# Patient Record
Sex: MALE | Race: WHITE | NOT HISPANIC OR LATINO | Employment: FULL TIME | URBAN - NONMETROPOLITAN AREA
[De-identification: names, ages, dates, MRNs, and addresses within clinical notes are randomized per-mention and may not be internally consistent; named-entity substitution may affect disease eponyms.]

---

## 2017-05-24 ENCOUNTER — OFFICE VISIT (OUTPATIENT)
Dept: SURGERY | Facility: CLINIC | Age: 47
End: 2017-05-24

## 2017-05-24 VITALS
DIASTOLIC BLOOD PRESSURE: 88 MMHG | OXYGEN SATURATION: 98 % | HEIGHT: 76 IN | TEMPERATURE: 98.2 F | BODY MASS INDEX: 32.12 KG/M2 | WEIGHT: 263.8 LBS | SYSTOLIC BLOOD PRESSURE: 154 MMHG | HEART RATE: 62 BPM

## 2017-05-24 DIAGNOSIS — R10.11 RIGHT UPPER QUADRANT ABDOMINAL PAIN: Primary | ICD-10-CM

## 2017-05-24 PROCEDURE — 99204 OFFICE O/P NEW MOD 45 MIN: CPT | Performed by: SURGERY

## 2017-05-24 RX ORDER — DESONIDE 0.5 MG/G
OINTMENT TOPICAL
Refills: 5 | Status: ON HOLD | COMMUNITY
Start: 2017-04-19 | End: 2017-10-31

## 2017-05-25 ENCOUNTER — HOSPITAL ENCOUNTER (OUTPATIENT)
Dept: CT IMAGING | Facility: HOSPITAL | Age: 47
Discharge: HOME | End: 2017-05-25
Attending: SURGERY

## 2017-10-25 ENCOUNTER — TRANSCRIBE ORDERS (OUTPATIENT)
Dept: CARDIOLOGY | Facility: HOSPITAL | Age: 47
End: 2017-10-25

## 2017-10-25 DIAGNOSIS — R94.39 ABNORMAL STRESS TEST: Primary | ICD-10-CM

## 2017-10-27 ENCOUNTER — APPOINTMENT (OUTPATIENT)
Dept: PULMONOLOGY | Facility: HOSPITAL | Age: 47
End: 2017-10-27

## 2017-10-27 ENCOUNTER — PREP FOR SURGERY (OUTPATIENT)
Dept: OTHER | Facility: HOSPITAL | Age: 47
End: 2017-10-27

## 2017-10-27 ENCOUNTER — APPOINTMENT (OUTPATIENT)
Dept: CARDIOLOGY | Facility: HOSPITAL | Age: 47
End: 2017-10-27

## 2017-10-27 ENCOUNTER — HOSPITAL ENCOUNTER (OUTPATIENT)
Facility: HOSPITAL | Age: 47
Setting detail: HOSPITAL OUTPATIENT SURGERY
Discharge: HOME | End: 2017-10-27
Attending: INTERNAL MEDICINE | Admitting: INTERNAL MEDICINE

## 2017-10-27 VITALS
DIASTOLIC BLOOD PRESSURE: 77 MMHG | OXYGEN SATURATION: 93 % | RESPIRATION RATE: 16 BRPM | TEMPERATURE: 98.5 F | WEIGHT: 261.25 LBS | SYSTOLIC BLOOD PRESSURE: 125 MMHG | HEART RATE: 71 BPM | HEIGHT: 76 IN | BODY MASS INDEX: 31.81 KG/M2

## 2017-10-27 DIAGNOSIS — I25.10 CAD, MULTIPLE VESSEL: Primary | ICD-10-CM

## 2017-10-27 DIAGNOSIS — I25.10 CAD IN NATIVE ARTERY: Primary | ICD-10-CM

## 2017-10-27 DIAGNOSIS — R94.39 ABNORMAL STRESS TEST: ICD-10-CM

## 2017-10-27 LAB
ANION GAP SERPL CALCULATED.3IONS-SCNC: 6 MMOL/L (ref 3–11)
BUN BLD-MCNC: 13 MG/DL (ref 9–23)
BUN/CREAT SERPL: 18.6 (ref 7–25)
CALCIUM SPEC-SCNC: 8.8 MG/DL (ref 8.7–10.4)
CHLORIDE SERPL-SCNC: 104 MMOL/L (ref 99–109)
CO2 SERPL-SCNC: 26 MMOL/L (ref 20–31)
CREAT BLD-MCNC: 0.7 MG/DL (ref 0.6–1.3)
DEPRECATED RDW RBC AUTO: 43.7 FL (ref 37–54)
ERYTHROCYTE [DISTWIDTH] IN BLOOD BY AUTOMATED COUNT: 14 % (ref 11.3–14.5)
GFR SERPL CREATININE-BSD FRML MDRD: 121 ML/MIN/1.73
GLUCOSE BLD-MCNC: 91 MG/DL (ref 70–100)
HCT VFR BLD AUTO: 46.2 % (ref 38.9–50.9)
HGB BLD-MCNC: 15.3 G/DL (ref 13.1–17.5)
MCH RBC QN AUTO: 28.5 PG (ref 27–31)
MCHC RBC AUTO-ENTMCNC: 33.1 G/DL (ref 32–36)
MCV RBC AUTO: 86 FL (ref 80–99)
PLATELET # BLD AUTO: 191 10*3/MM3 (ref 150–450)
PMV BLD AUTO: 10.1 FL (ref 6–12)
POTASSIUM BLD-SCNC: 4.1 MMOL/L (ref 3.5–5.5)
RBC # BLD AUTO: 5.37 10*6/MM3 (ref 4.2–5.76)
SODIUM BLD-SCNC: 136 MMOL/L (ref 132–146)
WBC NRBC COR # BLD: 6.84 10*3/MM3 (ref 3.5–10.8)

## 2017-10-27 PROCEDURE — 93458 L HRT ARTERY/VENTRICLE ANGIO: CPT | Performed by: INTERNAL MEDICINE

## 2017-10-27 PROCEDURE — 36415 COLL VENOUS BLD VENIPUNCTURE: CPT

## 2017-10-27 PROCEDURE — 94010 BREATHING CAPACITY TEST: CPT

## 2017-10-27 PROCEDURE — C1894 INTRO/SHEATH, NON-LASER: HCPCS | Performed by: INTERNAL MEDICINE

## 2017-10-27 PROCEDURE — 25010000002 MIDAZOLAM PER 1 MG: Performed by: INTERNAL MEDICINE

## 2017-10-27 PROCEDURE — 25010000002 HEPARIN (PORCINE) PER 1000 UNITS: Performed by: INTERNAL MEDICINE

## 2017-10-27 PROCEDURE — 93880 EXTRACRANIAL BILAT STUDY: CPT

## 2017-10-27 PROCEDURE — 0 IOPAMIDOL PER 1 ML: Performed by: INTERNAL MEDICINE

## 2017-10-27 PROCEDURE — 85027 COMPLETE CBC AUTOMATED: CPT | Performed by: INTERNAL MEDICINE

## 2017-10-27 PROCEDURE — 25010000002 FENTANYL CITRATE (PF) 100 MCG/2ML SOLUTION: Performed by: INTERNAL MEDICINE

## 2017-10-27 PROCEDURE — 80048 BASIC METABOLIC PNL TOTAL CA: CPT | Performed by: INTERNAL MEDICINE

## 2017-10-27 PROCEDURE — 93005 ELECTROCARDIOGRAM TRACING: CPT | Performed by: INTERNAL MEDICINE

## 2017-10-27 PROCEDURE — 94010 BREATHING CAPACITY TEST: CPT | Performed by: INTERNAL MEDICINE

## 2017-10-27 PROCEDURE — C1769 GUIDE WIRE: HCPCS | Performed by: INTERNAL MEDICINE

## 2017-10-27 RX ORDER — ASPIRIN 325 MG
325 TABLET, DELAYED RELEASE (ENTERIC COATED) ORAL DAILY
Status: DISCONTINUED | OUTPATIENT
Start: 2017-10-27 | End: 2017-10-27 | Stop reason: HOSPADM

## 2017-10-27 RX ORDER — CHLORHEXIDINE GLUCONATE 500 MG/1
1 CLOTH TOPICAL EVERY 12 HOURS PRN
Status: CANCELLED | OUTPATIENT
Start: 2017-10-27

## 2017-10-27 RX ORDER — CHLORHEXIDINE GLUCONATE 0.12 MG/ML
15 RINSE ORAL ONCE
Status: CANCELLED | OUTPATIENT
Start: 2017-10-27 | End: 2017-10-27

## 2017-10-27 RX ORDER — ALPRAZOLAM 0.25 MG/1
0.25 TABLET ORAL 3 TIMES DAILY PRN
Status: DISCONTINUED | OUTPATIENT
Start: 2017-10-27 | End: 2017-10-27 | Stop reason: HOSPADM

## 2017-10-27 RX ORDER — ASPIRIN 325 MG
325 TABLET ORAL NIGHTLY
Status: CANCELLED | OUTPATIENT
Start: 2017-10-27 | End: 2017-10-28

## 2017-10-27 RX ORDER — TEMAZEPAM 7.5 MG/1
7.5 CAPSULE ORAL NIGHTLY PRN
Status: DISCONTINUED | OUTPATIENT
Start: 2017-10-27 | End: 2017-10-27 | Stop reason: HOSPADM

## 2017-10-27 RX ORDER — MIDAZOLAM HYDROCHLORIDE 1 MG/ML
INJECTION INTRAMUSCULAR; INTRAVENOUS AS NEEDED
Status: DISCONTINUED | OUTPATIENT
Start: 2017-10-27 | End: 2017-10-27 | Stop reason: HOSPADM

## 2017-10-27 RX ORDER — SODIUM CHLORIDE 9 MG/ML
250 INJECTION, SOLUTION INTRAVENOUS CONTINUOUS
Status: ACTIVE | OUTPATIENT
Start: 2017-10-27 | End: 2017-10-27

## 2017-10-27 RX ORDER — NITROGLYCERIN 0.4 MG/1
0.4 TABLET SUBLINGUAL
Status: CANCELLED | OUTPATIENT
Start: 2017-10-27

## 2017-10-27 RX ORDER — FENTANYL CITRATE 50 UG/ML
INJECTION, SOLUTION INTRAMUSCULAR; INTRAVENOUS AS NEEDED
Status: DISCONTINUED | OUTPATIENT
Start: 2017-10-27 | End: 2017-10-27 | Stop reason: HOSPADM

## 2017-10-27 RX ORDER — LIDOCAINE HYDROCHLORIDE 10 MG/ML
INJECTION, SOLUTION INFILTRATION; PERINEURAL AS NEEDED
Status: DISCONTINUED | OUTPATIENT
Start: 2017-10-27 | End: 2017-10-27 | Stop reason: HOSPADM

## 2017-10-27 RX ADMIN — ASPIRIN 325 MG: 325 TABLET, DELAYED RELEASE ORAL at 07:37

## 2017-10-27 NOTE — PLAN OF CARE
Problem: Patient Care Overview (Adult)  Goal: Plan of Care Review  Outcome: Ongoing (interventions implemented as appropriate)  ORIENTED TO UNIT AND ROOM UPON ADMISSION TO [facility]    10/27/17 0717   Coping/Psychosocial Response Interventions   Plan Of Care Reviewed With patient   Patient Care Overview   Progress no change         Problem: Cardiac Catheterization with/without PCI (Adult)  Goal: Signs and Symptoms of Listed Potential Problems Will be Absent or Manageable (Cardiac Catheterization with/without PCI)  Outcome: Ongoing (interventions implemented as appropriate)  PT. HERE FOR HEART CATH; PROCEDURE TEACHING DONE AT [facility] PER BLADE GILMORE FOR DR. GERMAN    10/27/17 0717   Cardiac Catheterization with/without PCI   Problems Assessed (Cardiac Catheterization) other (see comments)   Problems Present (Cardiac Catheterization) other (see comments)

## 2017-10-29 ENCOUNTER — APPOINTMENT (OUTPATIENT)
Dept: PREADMISSION TESTING | Facility: HOSPITAL | Age: 47
End: 2017-10-29

## 2017-10-29 ENCOUNTER — HOSPITAL ENCOUNTER (OUTPATIENT)
Dept: GENERAL RADIOLOGY | Facility: HOSPITAL | Age: 47
Discharge: HOME | End: 2017-10-29
Admitting: PHYSICIAN ASSISTANT

## 2017-10-29 VITALS — HEIGHT: 76 IN | BODY MASS INDEX: 32 KG/M2 | WEIGHT: 262.79 LBS

## 2017-10-29 DIAGNOSIS — I25.10 CAD IN NATIVE ARTERY: ICD-10-CM

## 2017-10-29 LAB
ABO GROUP BLD: NORMAL
ALBUMIN SERPL-MCNC: 4.7 G/DL (ref 3.2–4.8)
ALBUMIN/GLOB SERPL: 1.8 G/DL (ref 1.5–2.5)
ALP SERPL-CCNC: 98 U/L (ref 25–100)
ALT SERPL W P-5'-P-CCNC: 34 U/L (ref 7–40)
AMPHET+METHAMPHET UR QL: NEGATIVE
AMPHETAMINES UR QL: NEGATIVE
ANION GAP SERPL CALCULATED.3IONS-SCNC: 5 MMOL/L (ref 3–11)
APTT PPP: 30.5 SECONDS (ref 24–31)
AST SERPL-CCNC: 20 U/L (ref 0–33)
BARBITURATES UR QL SCN: NEGATIVE
BASOPHILS # BLD AUTO: 0.02 10*3/MM3 (ref 0–0.2)
BASOPHILS NFR BLD AUTO: 0.2 % (ref 0–1)
BENZODIAZ UR QL SCN: NEGATIVE
BILIRUB SERPL-MCNC: 0.6 MG/DL (ref 0.3–1.2)
BLD GP AB SCN SERPL QL: NEGATIVE
BUN BLD-MCNC: 10 MG/DL (ref 9–23)
BUN/CREAT SERPL: 14.3 (ref 7–25)
BUPRENORPHINE SERPL-MCNC: NEGATIVE NG/ML
CALCIUM SPEC-SCNC: 9.1 MG/DL (ref 8.7–10.4)
CANNABINOIDS SERPL QL: NEGATIVE
CHLORIDE SERPL-SCNC: 104 MMOL/L (ref 99–109)
CO2 SERPL-SCNC: 30 MMOL/L (ref 20–31)
COCAINE UR QL: NEGATIVE
CREAT BLD-MCNC: 0.7 MG/DL (ref 0.6–1.3)
DEPRECATED RDW RBC AUTO: 45.1 FL (ref 37–54)
EOSINOPHIL # BLD AUTO: 0.11 10*3/MM3 (ref 0–0.3)
EOSINOPHIL NFR BLD AUTO: 1.2 % (ref 0–3)
ERYTHROCYTE [DISTWIDTH] IN BLOOD BY AUTOMATED COUNT: 14.2 % (ref 11.3–14.5)
GFR SERPL CREATININE-BSD FRML MDRD: 121 ML/MIN/1.73
GLOBULIN UR ELPH-MCNC: 2.6 GM/DL
GLUCOSE BLD-MCNC: 95 MG/DL (ref 70–100)
HBA1C MFR BLD: 5.4 % (ref 4.8–5.6)
HCT VFR BLD AUTO: 49.6 % (ref 38.9–50.9)
HGB BLD-MCNC: 16.7 G/DL (ref 13.1–17.5)
IMM GRANULOCYTES # BLD: 0.03 10*3/MM3 (ref 0–0.03)
IMM GRANULOCYTES NFR BLD: 0.3 % (ref 0–0.6)
INR PPP: 0.96
LYMPHOCYTES # BLD AUTO: 1.75 10*3/MM3 (ref 0.6–4.8)
LYMPHOCYTES NFR BLD AUTO: 19.3 % (ref 24–44)
MAGNESIUM SERPL-MCNC: 2.1 MG/DL (ref 1.3–2.7)
MCH RBC QN AUTO: 29.2 PG (ref 27–31)
MCHC RBC AUTO-ENTMCNC: 33.7 G/DL (ref 32–36)
MCV RBC AUTO: 86.7 FL (ref 80–99)
METHADONE UR QL SCN: NEGATIVE
MONOCYTES # BLD AUTO: 0.55 10*3/MM3 (ref 0–1)
MONOCYTES NFR BLD AUTO: 6.1 % (ref 0–12)
NEUTROPHILS # BLD AUTO: 6.6 10*3/MM3 (ref 1.5–8.3)
NEUTROPHILS NFR BLD AUTO: 72.9 % (ref 41–71)
OPIATES UR QL: NEGATIVE
OXYCODONE UR QL SCN: NEGATIVE
PA ADP PRP-ACNC: 171 PRU
PCP UR QL SCN: NEGATIVE
PLATELET # BLD AUTO: 199 10*3/MM3 (ref 150–450)
PMV BLD AUTO: 10.3 FL (ref 6–12)
POTASSIUM BLD-SCNC: 4.8 MMOL/L (ref 3.5–5.5)
PROPOXYPH UR QL: NEGATIVE
PROT SERPL-MCNC: 7.3 G/DL (ref 5.7–8.2)
PROTHROMBIN TIME: 10.5 SECONDS (ref 9.6–11.5)
RBC # BLD AUTO: 5.72 10*6/MM3 (ref 4.2–5.76)
RH BLD: NEGATIVE
SODIUM BLD-SCNC: 139 MMOL/L (ref 132–146)
TRICYCLICS UR QL SCN: NEGATIVE
WBC NRBC COR # BLD: 9.06 10*3/MM3 (ref 3.5–10.8)

## 2017-10-29 PROCEDURE — 86923 COMPATIBILITY TEST ELECTRIC: CPT

## 2017-10-30 ENCOUNTER — ANESTHESIA EVENT (OUTPATIENT)
Dept: PERIOP | Facility: HOSPITAL | Age: 47
End: 2017-10-30

## 2017-10-30 ENCOUNTER — APPOINTMENT (OUTPATIENT)
Dept: GENERAL RADIOLOGY | Facility: HOSPITAL | Age: 47
End: 2017-10-30

## 2017-10-30 ENCOUNTER — HOSPITAL ENCOUNTER (INPATIENT)
Facility: HOSPITAL | Age: 47
LOS: 3 days | Discharge: HOME | End: 2017-11-02
Attending: THORACIC SURGERY (CARDIOTHORACIC VASCULAR SURGERY) | Admitting: THORACIC SURGERY (CARDIOTHORACIC VASCULAR SURGERY)

## 2017-10-30 ENCOUNTER — ANESTHESIA (OUTPATIENT)
Dept: PERIOP | Facility: HOSPITAL | Age: 47
End: 2017-10-30

## 2017-10-30 ENCOUNTER — APPOINTMENT (OUTPATIENT)
Dept: PERIOP | Facility: HOSPITAL | Age: 47
End: 2017-10-30

## 2017-10-30 DIAGNOSIS — I25.10 CAD IN NATIVE ARTERY: ICD-10-CM

## 2017-10-30 DIAGNOSIS — Z74.09 IMPAIRED FUNCTIONAL MOBILITY, BALANCE, GAIT, AND ENDURANCE: Primary | ICD-10-CM

## 2017-10-30 PROBLEM — I10 ESSENTIAL HYPERTENSION: Status: RESOLVED | Noted: 2017-10-30 | Resolved: 2017-10-30

## 2017-10-30 PROBLEM — Z95.1 S/P CABG X 4: Status: ACTIVE | Noted: 2017-10-30

## 2017-10-30 PROBLEM — J95.821 RESPIRATORY FAILURE, POST-OPERATIVE: Status: ACTIVE | Noted: 2017-10-30

## 2017-10-30 PROBLEM — I10 ESSENTIAL HYPERTENSION: Status: ACTIVE | Noted: 2017-10-30

## 2017-10-30 PROBLEM — R94.39 ABNORMAL STRESS TEST: Status: RESOLVED | Noted: 2017-10-25 | Resolved: 2017-10-30

## 2017-10-30 LAB
ABO GROUP BLD: NORMAL
ACT BLD: 103 SECONDS (ref 82–152)
ACT BLD: 109 SECONDS (ref 82–152)
ACT BLD: 444 SECONDS (ref 82–152)
ACT BLD: 483 SECONDS (ref 82–152)
ACT BLD: 555 SECONDS (ref 82–152)
ACT BLD: 934 SECONDS (ref 82–152)
ALBUMIN SERPL-MCNC: 3.4 G/DL (ref 3.2–4.8)
ALBUMIN SERPL-MCNC: 4 G/DL (ref 3.2–4.8)
ANION GAP SERPL CALCULATED.3IONS-SCNC: 5 MMOL/L (ref 3–11)
ANION GAP SERPL CALCULATED.3IONS-SCNC: 8 MMOL/L (ref 3–11)
APTT PPP: 26.1 SECONDS (ref 24–31)
APTT PPP: 26.4 SECONDS (ref 24–31)
ARTERIAL PATENCY WRIST A: ABNORMAL
ARTERIAL PATENCY WRIST A: ABNORMAL
ARTERIAL PATENCY WRIST A: NORMAL
ATMOSPHERIC PRESS: ABNORMAL MMHG
ATMOSPHERIC PRESS: ABNORMAL MMHG
ATMOSPHERIC PRESS: NORMAL MMHG
BASE EXCESS BLDA CALC-SCNC: -0.2 MMOL/L (ref 0–2)
BASE EXCESS BLDA CALC-SCNC: -0.7 MMOL/L
BASE EXCESS BLDA CALC-SCNC: -1 MMOL/L (ref -5–5)
BASE EXCESS BLDA CALC-SCNC: -1.8 MMOL/L (ref 0–2)
BASE EXCESS BLDA CALC-SCNC: -2 MMOL/L (ref -5–5)
BASE EXCESS BLDA CALC-SCNC: -3 MMOL/L (ref -5–5)
BASE EXCESS BLDA CALC-SCNC: -4 MMOL/L (ref -5–5)
BASE EXCESS BLDA CALC-SCNC: -5 MMOL/L (ref -5–5)
BASE EXCESS BLDA CALC-SCNC: -6 MMOL/L (ref -5–5)
BDY SITE: ABNORMAL
BDY SITE: ABNORMAL
BDY SITE: NORMAL
BH CV XLRA MEAS LEFT CCA RATIO VEL: 90.4 CM/SEC
BH CV XLRA MEAS LEFT DIST CCA EDV: 26.5 CM/SEC
BH CV XLRA MEAS LEFT DIST CCA PSV: 90.4 CM/SEC
BH CV XLRA MEAS LEFT DIST ICA EDV: 22 CM/SEC
BH CV XLRA MEAS LEFT DIST ICA PSV: 62.9 CM/SEC
BH CV XLRA MEAS LEFT ICA RATIO VEL: 87.4 CM/SEC
BH CV XLRA MEAS LEFT ICA/CCA RATIO: 0.97
BH CV XLRA MEAS LEFT MID ICA EDV: 18.5 CM/SEC
BH CV XLRA MEAS LEFT MID ICA PSV: 60.1 CM/SEC
BH CV XLRA MEAS LEFT PROX CCA EDV: 28.5 CM/SEC
BH CV XLRA MEAS LEFT PROX CCA PSV: 101 CM/SEC
BH CV XLRA MEAS LEFT PROX ECA PSV: 111 CM/SEC
BH CV XLRA MEAS LEFT PROX ICA EDV: 22.6 CM/SEC
BH CV XLRA MEAS LEFT PROX ICA PSV: 87.4 CM/SEC
BH CV XLRA MEAS LEFT PROX SCLA PSV: 174 CM/SEC
BH CV XLRA MEAS LEFT VERTEBRAL A EDV: 19.3 CM/SEC
BH CV XLRA MEAS LEFT VERTEBRAL A PSV: 58.9 CM/SEC
BH CV XLRA MEAS RIGHT CCA RATIO VEL: 93.2 CM/SEC
BH CV XLRA MEAS RIGHT DIST CCA EDV: 29.3 CM/SEC
BH CV XLRA MEAS RIGHT DIST CCA PSV: 93.2 CM/SEC
BH CV XLRA MEAS RIGHT DIST ICA EDV: 21.1 CM/SEC
BH CV XLRA MEAS RIGHT DIST ICA PSV: 63.9 CM/SEC
BH CV XLRA MEAS RIGHT ICA RATIO VEL: 72.1 CM/SEC
BH CV XLRA MEAS RIGHT ICA/CCA RATIO: 0.77
BH CV XLRA MEAS RIGHT MID ICA EDV: 28.7 CM/SEC
BH CV XLRA MEAS RIGHT MID ICA PSV: 65.7 CM/SEC
BH CV XLRA MEAS RIGHT PROX CCA EDV: 25.2 CM/SEC
BH CV XLRA MEAS RIGHT PROX CCA PSV: 111 CM/SEC
BH CV XLRA MEAS RIGHT PROX ECA PSV: 113 CM/SEC
BH CV XLRA MEAS RIGHT PROX ICA EDV: 25.2 CM/SEC
BH CV XLRA MEAS RIGHT PROX ICA PSV: 72.1 CM/SEC
BH CV XLRA MEAS RIGHT PROX SCLA PSV: 88.5 CM/SEC
BH CV XLRA MEAS RIGHT VERTEBRAL A EDV: 14.1 CM/SEC
BH CV XLRA MEAS RIGHT VERTEBRAL A PSV: 47.5 CM/SEC
BILIRUB UR QL STRIP: NEGATIVE
BUN BLD-MCNC: 12 MG/DL (ref 9–23)
BUN BLD-MCNC: 12 MG/DL (ref 9–23)
BUN/CREAT SERPL: 13.3 (ref 7–25)
BUN/CREAT SERPL: 15 (ref 7–25)
CA-I BLDA-SCNC: 0.85 MMOL/L (ref 1.2–1.32)
CA-I BLDA-SCNC: 1.08 MMOL/L (ref 1.2–1.32)
CA-I BLDA-SCNC: 1.1 MMOL/L (ref 1.2–1.32)
CA-I BLDA-SCNC: 1.13 MMOL/L (ref 1.2–1.32)
CA-I BLDA-SCNC: 1.23 MMOL/L (ref 1.2–1.32)
CA-I BLDA-SCNC: 1.44 MMOL/L (ref 1.2–1.32)
CA-I SERPL ISE-MCNC: 1.41 MMOL/L (ref 1.12–1.32)
CALCIUM SPEC-SCNC: 8.3 MG/DL (ref 8.7–10.4)
CALCIUM SPEC-SCNC: 9.3 MG/DL (ref 8.7–10.4)
CHLORIDE SERPL-SCNC: 102 MMOL/L (ref 99–109)
CHLORIDE SERPL-SCNC: 109 MMOL/L (ref 99–109)
CLARITY UR: CLEAR
CO2 BLDA-SCNC: 21 MMOL/L (ref 24–29)
CO2 BLDA-SCNC: 23 MMOL/L (ref 24–29)
CO2 BLDA-SCNC: 24 MMOL/L (ref 24–29)
CO2 BLDA-SCNC: 24 MMOL/L (ref 24–29)
CO2 BLDA-SCNC: 25 MMOL/L (ref 24–29)
CO2 BLDA-SCNC: 26.8 MMOL/L (ref 22–33)
CO2 BLDA-SCNC: 27 MMOL/L (ref 24–29)
CO2 BLDA-SCNC: 27.3 MMOL/L (ref 22–33)
CO2 SERPL-SCNC: 24 MMOL/L (ref 20–31)
CO2 SERPL-SCNC: 24 MMOL/L (ref 20–31)
COHGB MFR BLD: 0.8 % (ref 0–2)
COHGB MFR BLD: 0.8 % (ref 0–2)
COHGB MFR BLD: 0.9 %
COLOR UR: YELLOW
CREAT BLD-MCNC: 0.8 MG/DL (ref 0.6–1.3)
CREAT BLD-MCNC: 0.9 MG/DL (ref 0.6–1.3)
DEPRECATED RDW RBC AUTO: 44.2 FL (ref 37–54)
DEPRECATED RDW RBC AUTO: 44.3 FL (ref 37–54)
ERYTHROCYTE [DISTWIDTH] IN BLOOD BY AUTOMATED COUNT: 13.9 % (ref 11.3–14.5)
ERYTHROCYTE [DISTWIDTH] IN BLOOD BY AUTOMATED COUNT: 14.1 % (ref 11.3–14.5)
FIBRINOGEN PPP-MCNC: 234 MG/DL (ref 200–400)
FSP PPP LA-ACNC: NORMAL
GFR SERPL CREATININE-BSD FRML MDRD: 104 ML/MIN/1.73
GFR SERPL CREATININE-BSD FRML MDRD: 90 ML/MIN/1.73
GLUCOSE BLD-MCNC: 143 MG/DL (ref 70–100)
GLUCOSE BLD-MCNC: 96 MG/DL (ref 70–100)
GLUCOSE BLDC GLUCOMTR-MCNC: 101 MG/DL (ref 70–130)
GLUCOSE BLDC GLUCOMTR-MCNC: 122 MG/DL (ref 70–130)
GLUCOSE BLDC GLUCOMTR-MCNC: 126 MG/DL (ref 70–130)
GLUCOSE BLDC GLUCOMTR-MCNC: 129 MG/DL (ref 70–130)
GLUCOSE BLDC GLUCOMTR-MCNC: 130 MG/DL (ref 70–130)
GLUCOSE BLDC GLUCOMTR-MCNC: 133 MG/DL (ref 70–130)
GLUCOSE BLDC GLUCOMTR-MCNC: 135 MG/DL (ref 70–130)
GLUCOSE BLDC GLUCOMTR-MCNC: 139 MG/DL (ref 70–130)
GLUCOSE BLDC GLUCOMTR-MCNC: 87 MG/DL (ref 70–130)
GLUCOSE BLDC GLUCOMTR-MCNC: 87 MG/DL (ref 70–130)
GLUCOSE BLDC GLUCOMTR-MCNC: 96 MG/DL (ref 70–130)
GLUCOSE UR STRIP-MCNC: NEGATIVE MG/DL
HCO3 BLDA-SCNC: 20.1 MMOL/L (ref 22–26)
HCO3 BLDA-SCNC: 21.3 MMOL/L (ref 22–26)
HCO3 BLDA-SCNC: 22.5 MMOL/L (ref 22–26)
HCO3 BLDA-SCNC: 22.6 MMOL/L (ref 22–26)
HCO3 BLDA-SCNC: 23.7 MMOL/L (ref 22–26)
HCO3 BLDA-SCNC: 25.1 MMOL/L
HCO3 BLDA-SCNC: 25.2 MMOL/L (ref 20–26)
HCO3 BLDA-SCNC: 25.3 MMOL/L (ref 22–26)
HCO3 BLDA-SCNC: 25.8 MMOL/L (ref 20–26)
HCT VFR BLD AUTO: 35.5 % (ref 38.9–50.9)
HCT VFR BLD AUTO: 36.8 % (ref 38.9–50.9)
HCT VFR BLD CALC: 37.6 %
HCT VFR BLD CALC: 39.4 %
HCT VFR BLDA CALC: 34 % (ref 38–51)
HCT VFR BLDA CALC: 35 % (ref 38–51)
HCT VFR BLDA CALC: 41 % (ref 38–51)
HCT VFR BLDA CALC: 48 % (ref 38–51)
HGB BLD-MCNC: 11.7 G/DL (ref 13.1–17.5)
HGB BLD-MCNC: 12 G/DL (ref 13.1–17.5)
HGB BLDA-MCNC: 11.6 G/DL (ref 12–17)
HGB BLDA-MCNC: 11.9 G/DL (ref 12–17)
HGB BLDA-MCNC: 12.3 G/DL (ref 13.5–17.5)
HGB BLDA-MCNC: 12.9 G/DL (ref 13.5–17.5)
HGB BLDA-MCNC: 13.9 G/DL (ref 12–17)
HGB BLDA-MCNC: 16.3 G/DL (ref 12–17)
HGB UR QL STRIP.AUTO: NEGATIVE
HOROWITZ INDEX BLD+IHG-RTO: 100 %
HOROWITZ INDEX BLD+IHG-RTO: 40 %
INR PPP: 1.06
INR PPP: 1.07
KETONES UR QL STRIP: NEGATIVE
LEUKOCYTE ESTERASE UR QL STRIP.AUTO: NEGATIVE
MAGNESIUM SERPL-MCNC: 2.6 MG/DL (ref 1.3–2.7)
MAGNESIUM SERPL-MCNC: 3.5 MG/DL (ref 1.3–2.7)
MCH RBC QN AUTO: 28.3 PG (ref 27–31)
MCH RBC QN AUTO: 28.5 PG (ref 27–31)
MCHC RBC AUTO-ENTMCNC: 32.6 G/DL (ref 32–36)
MCHC RBC AUTO-ENTMCNC: 33 G/DL (ref 32–36)
MCV RBC AUTO: 86.4 FL (ref 80–99)
MCV RBC AUTO: 86.8 FL (ref 80–99)
METHGB BLD QL: 1.2 % (ref 0–1.5)
METHGB BLD QL: 1.3 %
METHGB BLD QL: 1.3 % (ref 0–1.5)
MODALITY: ABNORMAL
MODALITY: ABNORMAL
MODALITY: NORMAL
NITRITE UR QL STRIP: NEGATIVE
OXYHGB MFR BLDV: 95 % (ref 94–99)
OXYHGB MFR BLDV: 95.9 % (ref 94–99)
OXYHGB MFR BLDV: 97.4 % (ref 94–99)
PCO2 BLDA: 38.6 MM HG (ref 35–45)
PCO2 BLDA: 38.7 MM HG (ref 35–45)
PCO2 BLDA: 41.4 MM HG (ref 35–45)
PCO2 BLDA: 42.3 MM HG (ref 35–45)
PCO2 BLDA: 45 MM HG
PCO2 BLDA: 47 MM HG (ref 35–48)
PCO2 BLDA: 49.5 MM HG (ref 35–45)
PCO2 BLDA: 51.1 MM HG (ref 35–48)
PCO2 BLDA: 56.2 MM HG (ref 35–45)
PH BLDA: 7.26 PH UNITS (ref 7.35–7.6)
PH BLDA: 7.27 PH UNITS (ref 7.35–7.6)
PH BLDA: 7.3 PH UNITS (ref 7.35–7.45)
PH BLDA: 7.32 PH UNITS (ref 7.35–7.6)
PH BLDA: 7.32 PH UNITS (ref 7.35–7.6)
PH BLDA: 7.34 PH UNITS (ref 7.35–7.6)
PH BLDA: 7.35 PH UNITS
PH BLDA: 7.35 PH UNITS (ref 7.35–7.45)
PH BLDA: 7.39 PH UNITS (ref 7.35–7.6)
PH UR STRIP.AUTO: <=5 [PH] (ref 5–8)
PHOSPHATE SERPL-MCNC: 4.4 MG/DL (ref 2.4–5.1)
PHOSPHATE SERPL-MCNC: 5.2 MG/DL (ref 2.4–5.1)
PLATELET # BLD AUTO: 179 10*3/MM3 (ref 150–450)
PLATELET # BLD AUTO: 210 10*3/MM3 (ref 150–450)
PMV BLD AUTO: 9.4 FL (ref 6–12)
PMV BLD AUTO: 9.9 FL (ref 6–12)
PO2 BLDA: 103 MM HG (ref 83–108)
PO2 BLDA: 214 MM HG (ref 83–108)
PO2 BLDA: 282 MMHG (ref 80–105)
PO2 BLDA: 310 MMHG (ref 80–105)
PO2 BLDA: 311 MMHG (ref 80–105)
PO2 BLDA: 388 MMHG (ref 80–105)
PO2 BLDA: 64 MMHG (ref 80–105)
PO2 BLDA: 88 MMHG (ref 80–105)
PO2 BLDA: 91 MM HG
POTASSIUM BLD-SCNC: 4 MMOL/L (ref 3.5–5.5)
POTASSIUM BLD-SCNC: 4.3 MMOL/L (ref 3.5–5.5)
POTASSIUM BLDA-SCNC: 4.1 MMOL/L (ref 3.5–4.9)
POTASSIUM BLDA-SCNC: 4.2 MMOL/L (ref 3.5–4.9)
POTASSIUM BLDA-SCNC: 4.3 MMOL/L (ref 3.5–4.9)
POTASSIUM BLDA-SCNC: 4.3 MMOL/L (ref 3.5–4.9)
POTASSIUM BLDA-SCNC: 4.9 MMOL/L (ref 3.5–4.9)
POTASSIUM BLDA-SCNC: 5.5 MMOL/L (ref 3.5–4.9)
PROT UR QL STRIP: NEGATIVE
PROTHROMBIN TIME: 11.6 SECONDS (ref 9.6–11.5)
PROTHROMBIN TIME: 11.7 SECONDS (ref 9.6–11.5)
RBC # BLD AUTO: 4.11 10*6/MM3 (ref 4.2–5.76)
RBC # BLD AUTO: 4.24 10*6/MM3 (ref 4.2–5.76)
RH BLD: NEGATIVE
SAO2 % BLDA: 100 % (ref 95–98)
SAO2 % BLDA: 90 % (ref 95–98)
SAO2 % BLDA: 97 % (ref 95–98)
SODIUM BLD-SCNC: 134 MMOL/L (ref 132–146)
SODIUM BLD-SCNC: 138 MMOL/L (ref 132–146)
SODIUM BLDA-SCNC: 134 MMOL/L (ref 138–146)
SODIUM BLDA-SCNC: 134 MMOL/L (ref 138–146)
SODIUM BLDA-SCNC: 135 MMOL/L (ref 138–146)
SODIUM BLDA-SCNC: 137 MMOL/L (ref 138–146)
SODIUM BLDA-SCNC: 137 MMOL/L (ref 138–146)
SODIUM BLDA-SCNC: 138 MMOL/L (ref 138–146)
SP GR UR STRIP: <=1.005 (ref 1–1.03)
UROBILINOGEN UR QL STRIP: NORMAL
WBC NRBC COR # BLD: 11.98 10*3/MM3 (ref 3.5–10.8)
WBC NRBC COR # BLD: 12.68 10*3/MM3 (ref 3.5–10.8)

## 2017-10-30 PROCEDURE — 25010000002 CEFUROXIME PER 750 MG: Performed by: PHYSICIAN ASSISTANT

## 2017-10-30 PROCEDURE — C1713 ANCHOR/SCREW BN/BN,TIS/BN: HCPCS | Performed by: THORACIC SURGERY (CARDIOTHORACIC VASCULAR SURGERY)

## 2017-10-30 PROCEDURE — P9035 PLATELET PHERES LEUKOREDUCED: HCPCS

## 2017-10-30 PROCEDURE — 83735 ASSAY OF MAGNESIUM: CPT | Performed by: PHYSICIAN ASSISTANT

## 2017-10-30 PROCEDURE — 82947 ASSAY GLUCOSE BLOOD QUANT: CPT

## 2017-10-30 PROCEDURE — 25010000002 MIDAZOLAM PER 1 MG: Performed by: ANESTHESIOLOGY

## 2017-10-30 PROCEDURE — 94002 VENT MGMT INPAT INIT DAY: CPT

## 2017-10-30 PROCEDURE — 99291 CRITICAL CARE FIRST HOUR: CPT | Performed by: INTERNAL MEDICINE

## 2017-10-30 PROCEDURE — 94799 UNLISTED PULMONARY SVC/PX: CPT

## 2017-10-30 PROCEDURE — 86900 BLOOD TYPING SEROLOGIC ABO: CPT

## 2017-10-30 PROCEDURE — 25010000002 PHENYLEPHRINE PER 1 ML

## 2017-10-30 PROCEDURE — 25010000002 AMIODARONE IN DEXTROSE 5% 360-4.14 MG/200ML-% SOLUTION: Performed by: THORACIC SURGERY (CARDIOTHORACIC VASCULAR SURGERY)

## 2017-10-30 PROCEDURE — C1729 CATH, DRAINAGE: HCPCS | Performed by: THORACIC SURGERY (CARDIOTHORACIC VASCULAR SURGERY)

## 2017-10-30 PROCEDURE — 25010000002 PAPAVERINE PER 60 MG: Performed by: THORACIC SURGERY (CARDIOTHORACIC VASCULAR SURGERY)

## 2017-10-30 PROCEDURE — 93005 ELECTROCARDIOGRAM TRACING: CPT | Performed by: PHYSICIAN ASSISTANT

## 2017-10-30 PROCEDURE — 33533 CABG ARTERIAL SINGLE: CPT | Performed by: PHYSICIAN ASSISTANT

## 2017-10-30 PROCEDURE — C1751 CATH, INF, PER/CENT/MIDLINE: HCPCS | Performed by: ANESTHESIOLOGY

## 2017-10-30 PROCEDURE — 25010000002 FENTANYL CITRATE (PF) 100 MCG/2ML SOLUTION: Performed by: THORACIC SURGERY (CARDIOTHORACIC VASCULAR SURGERY)

## 2017-10-30 PROCEDURE — 85347 COAGULATION TIME ACTIVATED: CPT

## 2017-10-30 PROCEDURE — 85384 FIBRINOGEN ACTIVITY: CPT | Performed by: THORACIC SURGERY (CARDIOTHORACIC VASCULAR SURGERY)

## 2017-10-30 PROCEDURE — 85730 THROMBOPLASTIN TIME PARTIAL: CPT | Performed by: THORACIC SURGERY (CARDIOTHORACIC VASCULAR SURGERY)

## 2017-10-30 PROCEDURE — 25010000002 ALBUMIN HUMAN 5% PER 50 ML

## 2017-10-30 PROCEDURE — 25010000002 DOBUTAMINE PER 250 MG: Performed by: PHYSICIAN ASSISTANT

## 2017-10-30 PROCEDURE — 25010000002 FUROSEMIDE PER 20 MG

## 2017-10-30 PROCEDURE — 84132 ASSAY OF SERUM POTASSIUM: CPT

## 2017-10-30 PROCEDURE — 25810000003 DEXTROSE 5 % WITH KCL 20 MEQ 20-5 MEQ/L-% SOLUTION: Performed by: PHYSICIAN ASSISTANT

## 2017-10-30 PROCEDURE — P9041 ALBUMIN (HUMAN),5%, 50ML: HCPCS | Performed by: PHYSICIAN ASSISTANT

## 2017-10-30 PROCEDURE — 25010000002 PROTAMINE SULFATE PER 10 MG

## 2017-10-30 PROCEDURE — 36430 TRANSFUSION BLD/BLD COMPNT: CPT

## 2017-10-30 PROCEDURE — 25010000002 AMIODARONE PER 30 MG

## 2017-10-30 PROCEDURE — P9041 ALBUMIN (HUMAN),5%, 50ML: HCPCS

## 2017-10-30 PROCEDURE — 33519 CABG ARTERY-VEIN THREE: CPT | Performed by: THORACIC SURGERY (CARDIOTHORACIC VASCULAR SURGERY)

## 2017-10-30 PROCEDURE — S0260 H&P FOR SURGERY: HCPCS | Performed by: THORACIC SURGERY (CARDIOTHORACIC VASCULAR SURGERY)

## 2017-10-30 PROCEDURE — 87086 URINE CULTURE/COLONY COUNT: CPT | Performed by: PHYSICIAN ASSISTANT

## 2017-10-30 PROCEDURE — 93318 ECHO TRANSESOPHAGEAL INTRAOP: CPT | Performed by: ANESTHESIOLOGY

## 2017-10-30 PROCEDURE — 82330 ASSAY OF CALCIUM: CPT | Performed by: PHYSICIAN ASSISTANT

## 2017-10-30 PROCEDURE — 25010000002 HEPARIN (PORCINE) PER 1000 UNITS: Performed by: THORACIC SURGERY (CARDIOTHORACIC VASCULAR SURGERY)

## 2017-10-30 PROCEDURE — 25010000002 POTASSIUM CHLORIDE PER 2 MEQ OF POTASSIUM

## 2017-10-30 PROCEDURE — 33508 ENDOSCOPIC VEIN HARVEST: CPT | Performed by: THORACIC SURGERY (CARDIOTHORACIC VASCULAR SURGERY)

## 2017-10-30 PROCEDURE — 71010 HC CHEST PA OR AP: CPT

## 2017-10-30 PROCEDURE — 84295 ASSAY OF SERUM SODIUM: CPT

## 2017-10-30 PROCEDURE — 25010000002 MORPHINE SULFATE (PF) 2 MG/ML SOLUTION: Performed by: THORACIC SURGERY (CARDIOTHORACIC VASCULAR SURGERY)

## 2017-10-30 PROCEDURE — 25010000002 PROPOFOL 1000 MG/ML EMULSION: Performed by: THORACIC SURGERY (CARDIOTHORACIC VASCULAR SURGERY)

## 2017-10-30 PROCEDURE — 02100Z9 BYPASS CORONARY ARTERY, ONE ARTERY FROM LEFT INTERNAL MAMMARY, OPEN APPROACH: ICD-10-PCS | Performed by: THORACIC SURGERY (CARDIOTHORACIC VASCULAR SURGERY)

## 2017-10-30 PROCEDURE — 06BP4ZZ EXCISION OF RIGHT SAPHENOUS VEIN, PERCUTANEOUS ENDOSCOPIC APPROACH: ICD-10-PCS | Performed by: THORACIC SURGERY (CARDIOTHORACIC VASCULAR SURGERY)

## 2017-10-30 PROCEDURE — 81003 URINALYSIS AUTO W/O SCOPE: CPT | Performed by: PHYSICIAN ASSISTANT

## 2017-10-30 PROCEDURE — 25010000002 HEPARIN (PORCINE) PER 1000 UNITS: Performed by: ANESTHESIOLOGY

## 2017-10-30 PROCEDURE — 85610 PROTHROMBIN TIME: CPT | Performed by: THORACIC SURGERY (CARDIOTHORACIC VASCULAR SURGERY)

## 2017-10-30 PROCEDURE — 25810000003 DEXTROSE 5 % WITH KCL 20 MEQ 20-5 MEQ/L-% SOLUTION: Performed by: THORACIC SURGERY (CARDIOTHORACIC VASCULAR SURGERY)

## 2017-10-30 PROCEDURE — 33519 CABG ARTERY-VEIN THREE: CPT | Performed by: PHYSICIAN ASSISTANT

## 2017-10-30 PROCEDURE — 82805 BLOOD GASES W/O2 SATURATION: CPT | Performed by: PHYSICIAN ASSISTANT

## 2017-10-30 PROCEDURE — 25010000002 MAGNESIUM SULFATE PER 500 MG OF MAGNESIUM

## 2017-10-30 PROCEDURE — 80069 RENAL FUNCTION PANEL: CPT | Performed by: PHYSICIAN ASSISTANT

## 2017-10-30 PROCEDURE — 021209W BYPASS CORONARY ARTERY, THREE ARTERIES FROM AORTA WITH AUTOLOGOUS VENOUS TISSUE, OPEN APPROACH: ICD-10-PCS | Performed by: THORACIC SURGERY (CARDIOTHORACIC VASCULAR SURGERY)

## 2017-10-30 PROCEDURE — 25010000002 HEPARIN (PORCINE) PER 1000 UNITS

## 2017-10-30 PROCEDURE — 25010000002 INSULIN REGULAR HUMAN PER 5 UNITS: Performed by: PHYSICIAN ASSISTANT

## 2017-10-30 PROCEDURE — 25010000002 PROTAMINE SULFATE PER 10 MG: Performed by: ANESTHESIOLOGY

## 2017-10-30 PROCEDURE — 25010000002 PROPOFOL 10 MG/ML EMULSION: Performed by: ANESTHESIOLOGY

## 2017-10-30 PROCEDURE — 85610 PROTHROMBIN TIME: CPT | Performed by: PHYSICIAN ASSISTANT

## 2017-10-30 PROCEDURE — 85362 FIBRIN DEGRADATION PRODUCTS: CPT | Performed by: THORACIC SURGERY (CARDIOTHORACIC VASCULAR SURGERY)

## 2017-10-30 PROCEDURE — 85730 THROMBOPLASTIN TIME PARTIAL: CPT | Performed by: PHYSICIAN ASSISTANT

## 2017-10-30 PROCEDURE — 25010000002 CEFUROXIME: Performed by: PHYSICIAN ASSISTANT

## 2017-10-30 PROCEDURE — 82330 ASSAY OF CALCIUM: CPT

## 2017-10-30 PROCEDURE — 82805 BLOOD GASES W/O2 SATURATION: CPT | Performed by: THORACIC SURGERY (CARDIOTHORACIC VASCULAR SURGERY)

## 2017-10-30 PROCEDURE — 85014 HEMATOCRIT: CPT

## 2017-10-30 PROCEDURE — 25010000002 PHENYLEPHRINE PER 1 ML: Performed by: ANESTHESIOLOGY

## 2017-10-30 PROCEDURE — A4648 IMPLANTABLE TISSUE MARKER: HCPCS | Performed by: THORACIC SURGERY (CARDIOTHORACIC VASCULAR SURGERY)

## 2017-10-30 PROCEDURE — 5A1221Z PERFORMANCE OF CARDIAC OUTPUT, CONTINUOUS: ICD-10-PCS | Performed by: THORACIC SURGERY (CARDIOTHORACIC VASCULAR SURGERY)

## 2017-10-30 PROCEDURE — 93010 ELECTROCARDIOGRAM REPORT: CPT | Performed by: INTERNAL MEDICINE

## 2017-10-30 PROCEDURE — 82803 BLOOD GASES ANY COMBINATION: CPT

## 2017-10-30 PROCEDURE — 86901 BLOOD TYPING SEROLOGIC RH(D): CPT

## 2017-10-30 PROCEDURE — 25010000002 MANNITOL PER 50 ML

## 2017-10-30 PROCEDURE — C1894 INTRO/SHEATH, NON-LASER: HCPCS | Performed by: THORACIC SURGERY (CARDIOTHORACIC VASCULAR SURGERY)

## 2017-10-30 PROCEDURE — 33533 CABG ARTERIAL SINGLE: CPT | Performed by: THORACIC SURGERY (CARDIOTHORACIC VASCULAR SURGERY)

## 2017-10-30 PROCEDURE — 85027 COMPLETE CBC AUTOMATED: CPT | Performed by: PHYSICIAN ASSISTANT

## 2017-10-30 PROCEDURE — 25010000002 ALBUMIN HUMAN 5% PER 50 ML: Performed by: PHYSICIAN ASSISTANT

## 2017-10-30 RX ORDER — FAMOTIDINE 20 MG/1
20 TABLET, FILM COATED ORAL 2 TIMES DAILY
Status: DISCONTINUED | OUTPATIENT
Start: 2017-10-30 | End: 2017-10-30 | Stop reason: HOSPADM

## 2017-10-30 RX ORDER — LIDOCAINE HYDROCHLORIDE 20 MG/ML
INJECTION, SOLUTION INFILTRATION; PERINEURAL AS NEEDED
Status: DISCONTINUED | OUTPATIENT
Start: 2017-10-30 | End: 2017-10-30 | Stop reason: SURG

## 2017-10-30 RX ORDER — BISACODYL 10 MG
10 SUPPOSITORY, RECTAL RECTAL DAILY PRN
Status: DISCONTINUED | OUTPATIENT
Start: 2017-10-31 | End: 2017-11-01

## 2017-10-30 RX ORDER — MAGNESIUM SULFATE HEPTAHYDRATE 40 MG/ML
4 INJECTION, SOLUTION INTRAVENOUS AS NEEDED
Status: DISCONTINUED | OUTPATIENT
Start: 2017-10-30 | End: 2017-11-01

## 2017-10-30 RX ORDER — VECURONIUM BROMIDE 1 MG/ML
INJECTION, POWDER, LYOPHILIZED, FOR SOLUTION INTRAVENOUS AS NEEDED
Status: DISCONTINUED | OUTPATIENT
Start: 2017-10-30 | End: 2017-10-30 | Stop reason: SURG

## 2017-10-30 RX ORDER — POTASSIUM CHLORIDE, DEXTROSE MONOHYDRATE 150; 5 MG/100ML; G/100ML
30 INJECTION, SOLUTION INTRAVENOUS CONTINUOUS
Status: DISCONTINUED | OUTPATIENT
Start: 2017-10-30 | End: 2017-10-31

## 2017-10-30 RX ORDER — DOCUSATE SODIUM 100 MG/1
100 CAPSULE, LIQUID FILLED ORAL 2 TIMES DAILY PRN
Status: DISCONTINUED | OUTPATIENT
Start: 2017-10-30 | End: 2017-11-02 | Stop reason: HOSPADM

## 2017-10-30 RX ORDER — MIDAZOLAM HYDROCHLORIDE 1 MG/ML
INJECTION INTRAMUSCULAR; INTRAVENOUS AS NEEDED
Status: DISCONTINUED | OUTPATIENT
Start: 2017-10-30 | End: 2017-10-30 | Stop reason: SURG

## 2017-10-30 RX ORDER — ALBUMIN, HUMAN INJ 5% 5 %
500 SOLUTION INTRAVENOUS AS NEEDED
Status: COMPLETED | OUTPATIENT
Start: 2017-10-30 | End: 2017-10-30

## 2017-10-30 RX ORDER — DEXMEDETOMIDINE HYDROCHLORIDE 4 UG/ML
.2-1.5 INJECTION, SOLUTION INTRAVENOUS CONTINUOUS PRN
Status: DISCONTINUED | OUTPATIENT
Start: 2017-10-30 | End: 2017-10-31

## 2017-10-30 RX ORDER — ASPIRIN 325 MG
325 TABLET ORAL ONCE
Status: COMPLETED | OUTPATIENT
Start: 2017-10-30 | End: 2017-10-30

## 2017-10-30 RX ORDER — PROTAMINE SULFATE 10 MG/ML
INJECTION, SOLUTION INTRAVENOUS AS NEEDED
Status: DISCONTINUED | OUTPATIENT
Start: 2017-10-30 | End: 2017-10-30 | Stop reason: SURG

## 2017-10-30 RX ORDER — SODIUM CHLORIDE, SODIUM LACTATE, POTASSIUM CHLORIDE, CALCIUM CHLORIDE 600; 310; 30; 20 MG/100ML; MG/100ML; MG/100ML; MG/100ML
20 INJECTION, SOLUTION INTRAVENOUS CONTINUOUS
Status: DISCONTINUED | OUTPATIENT
Start: 2017-10-30 | End: 2017-10-30

## 2017-10-30 RX ORDER — FAMOTIDINE 20 MG/1
20 TABLET, FILM COATED ORAL 2 TIMES DAILY
Status: DISCONTINUED | OUTPATIENT
Start: 2017-10-30 | End: 2017-10-31

## 2017-10-30 RX ORDER — PROTAMINE SULFATE 10 MG/ML
INJECTION, SOLUTION INTRAVENOUS
Status: COMPLETED
Start: 2017-10-30 | End: 2017-10-30

## 2017-10-30 RX ORDER — NITROGLYCERIN 20 MG/100ML
5-200 INJECTION INTRAVENOUS CONTINUOUS PRN
Status: DISCONTINUED | OUTPATIENT
Start: 2017-10-30 | End: 2017-10-31

## 2017-10-30 RX ORDER — POTASSIUM CHLORIDE 750 MG/1
40 CAPSULE, EXTENDED RELEASE ORAL AS NEEDED
Status: DISCONTINUED | OUTPATIENT
Start: 2017-10-30 | End: 2017-11-01

## 2017-10-30 RX ORDER — BISACODYL 5 MG/1
10 TABLET, DELAYED RELEASE ORAL DAILY PRN
Status: DISCONTINUED | OUTPATIENT
Start: 2017-10-30 | End: 2017-11-02 | Stop reason: HOSPADM

## 2017-10-30 RX ORDER — SODIUM CHLORIDE, SODIUM LACTATE, POTASSIUM CHLORIDE, CALCIUM CHLORIDE 600; 310; 30; 20 MG/100ML; MG/100ML; MG/100ML; MG/100ML
9 INJECTION, SOLUTION INTRAVENOUS CONTINUOUS
Status: DISCONTINUED | OUTPATIENT
Start: 2017-10-30 | End: 2017-10-31

## 2017-10-30 RX ORDER — SODIUM CHLORIDE 9 MG/ML
30 INJECTION, SOLUTION INTRAVENOUS CONTINUOUS PRN
Status: DISCONTINUED | OUTPATIENT
Start: 2017-10-30 | End: 2017-10-31

## 2017-10-30 RX ORDER — LIDOCAINE HYDROCHLORIDE 10 MG/ML
0.5 INJECTION, SOLUTION EPIDURAL; INFILTRATION; INTRACAUDAL; PERINEURAL ONCE AS NEEDED
Status: DISCONTINUED | OUTPATIENT
Start: 2017-10-30 | End: 2017-10-30 | Stop reason: HOSPADM

## 2017-10-30 RX ORDER — ROCURONIUM BROMIDE 10 MG/ML
INJECTION, SOLUTION INTRAVENOUS AS NEEDED
Status: DISCONTINUED | OUTPATIENT
Start: 2017-10-30 | End: 2017-10-30 | Stop reason: SURG

## 2017-10-30 RX ORDER — TRANEXAMIC ACID 100 MG/ML
2.5 INJECTION, SOLUTION INTRAVENOUS ONCE
Status: DISCONTINUED | OUTPATIENT
Start: 2017-10-30 | End: 2017-10-30 | Stop reason: HOSPADM

## 2017-10-30 RX ORDER — DOPAMINE HYDROCHLORIDE 160 MG/100ML
2-20 INJECTION, SOLUTION INTRAVENOUS CONTINUOUS PRN
Status: DISCONTINUED | OUTPATIENT
Start: 2017-10-30 | End: 2017-10-31

## 2017-10-30 RX ORDER — SODIUM CHLORIDE 9 MG/ML
INJECTION, SOLUTION INTRAVENOUS CONTINUOUS PRN
Status: DISCONTINUED | OUTPATIENT
Start: 2017-10-30 | End: 2017-10-30 | Stop reason: SURG

## 2017-10-30 RX ORDER — SODIUM CHLORIDE 0.9 % (FLUSH) 0.9 %
1-10 SYRINGE (ML) INJECTION AS NEEDED
Status: DISCONTINUED | OUTPATIENT
Start: 2017-10-30 | End: 2017-10-30 | Stop reason: HOSPADM

## 2017-10-30 RX ORDER — MEPERIDINE HYDROCHLORIDE 25 MG/ML
25 INJECTION INTRAMUSCULAR; INTRAVENOUS; SUBCUTANEOUS EVERY 4 HOURS PRN
Status: DISCONTINUED | OUTPATIENT
Start: 2017-10-30 | End: 2017-10-31

## 2017-10-30 RX ORDER — HYDROCODONE BITARTRATE AND ACETAMINOPHEN 7.5; 325 MG/1; MG/1
1 TABLET ORAL EVERY 4 HOURS PRN
Status: DISCONTINUED | OUTPATIENT
Start: 2017-10-30 | End: 2017-10-31

## 2017-10-30 RX ORDER — SENNA AND DOCUSATE SODIUM 50; 8.6 MG/1; MG/1
2 TABLET, FILM COATED ORAL 2 TIMES DAILY
Status: DISCONTINUED | OUTPATIENT
Start: 2017-10-30 | End: 2017-11-02 | Stop reason: HOSPADM

## 2017-10-30 RX ORDER — MAGNESIUM HYDROXIDE 1200 MG/15ML
LIQUID ORAL AS NEEDED
Status: DISCONTINUED | OUTPATIENT
Start: 2017-10-30 | End: 2017-10-30 | Stop reason: HOSPADM

## 2017-10-30 RX ORDER — METOPROLOL TARTRATE 5 MG/5ML
2.5 INJECTION INTRAVENOUS EVERY 6 HOURS SCHEDULED
Status: DISCONTINUED | OUTPATIENT
Start: 2017-10-30 | End: 2017-10-31

## 2017-10-30 RX ORDER — PROTAMINE SULFATE 10 MG/ML
50 INJECTION, SOLUTION INTRAVENOUS ONCE
Status: DISCONTINUED | OUTPATIENT
Start: 2017-10-30 | End: 2017-10-31

## 2017-10-30 RX ORDER — FENTANYL CITRATE 50 UG/ML
25 INJECTION, SOLUTION INTRAMUSCULAR; INTRAVENOUS
Status: DISCONTINUED | OUTPATIENT
Start: 2017-10-30 | End: 2017-11-01

## 2017-10-30 RX ORDER — MAGNESIUM SULFATE HEPTAHYDRATE 40 MG/ML
2 INJECTION, SOLUTION INTRAVENOUS AS NEEDED
Status: DISCONTINUED | OUTPATIENT
Start: 2017-10-30 | End: 2017-11-01

## 2017-10-30 RX ORDER — CHLORHEXIDINE GLUCONATE 0.12 MG/ML
15 RINSE ORAL ONCE
Status: COMPLETED | OUTPATIENT
Start: 2017-10-30 | End: 2017-10-30

## 2017-10-30 RX ORDER — POTASSIUM CHLORIDE 29.8 MG/ML
20 INJECTION INTRAVENOUS
Status: DISCONTINUED | OUTPATIENT
Start: 2017-10-30 | End: 2017-11-01

## 2017-10-30 RX ORDER — MORPHINE SULFATE 2 MG/ML
2 INJECTION, SOLUTION INTRAMUSCULAR; INTRAVENOUS
Status: DISCONTINUED | OUTPATIENT
Start: 2017-10-30 | End: 2017-10-31

## 2017-10-30 RX ORDER — NALOXONE HCL 0.4 MG/ML
0.4 VIAL (ML) INJECTION
Status: DISCONTINUED | OUTPATIENT
Start: 2017-10-30 | End: 2017-11-01

## 2017-10-30 RX ORDER — PROPOFOL 10 MG/ML
VIAL (ML) INTRAVENOUS CONTINUOUS PRN
Status: DISCONTINUED | OUTPATIENT
Start: 2017-10-30 | End: 2017-10-30 | Stop reason: SURG

## 2017-10-30 RX ORDER — ASPIRIN 325 MG
325 TABLET, DELAYED RELEASE (ENTERIC COATED) ORAL DAILY
Status: DISCONTINUED | OUTPATIENT
Start: 2017-10-31 | End: 2017-11-02 | Stop reason: HOSPADM

## 2017-10-30 RX ORDER — SODIUM CHLORIDE 0.9 % (FLUSH) 0.9 %
30 SYRINGE (ML) INJECTION ONCE AS NEEDED
Status: DISCONTINUED | OUTPATIENT
Start: 2017-10-30 | End: 2017-10-31

## 2017-10-30 RX ORDER — ONDANSETRON 2 MG/ML
4 INJECTION INTRAMUSCULAR; INTRAVENOUS EVERY 6 HOURS PRN
Status: DISCONTINUED | OUTPATIENT
Start: 2017-10-30 | End: 2017-11-02 | Stop reason: HOSPADM

## 2017-10-30 RX ORDER — POTASSIUM CHLORIDE 1.5 G/1.77G
40 POWDER, FOR SOLUTION ORAL AS NEEDED
Status: DISCONTINUED | OUTPATIENT
Start: 2017-10-30 | End: 2017-11-01

## 2017-10-30 RX ORDER — DOBUTAMINE HYDROCHLORIDE 100 MG/100ML
2-20 INJECTION INTRAVENOUS CONTINUOUS PRN
Status: DISCONTINUED | OUTPATIENT
Start: 2017-10-30 | End: 2017-10-31

## 2017-10-30 RX ORDER — LIDOCAINE HYDROCHLORIDE 10 MG/ML
1 INJECTION, SOLUTION INFILTRATION; PERINEURAL ONCE
Status: COMPLETED | OUTPATIENT
Start: 2017-10-30 | End: 2017-10-30

## 2017-10-30 RX ORDER — PAPAVERINE HYDROCHLORIDE 30 MG/ML
INJECTION INTRAMUSCULAR; INTRAVENOUS AS NEEDED
Status: DISCONTINUED | OUTPATIENT
Start: 2017-10-30 | End: 2017-10-30 | Stop reason: HOSPADM

## 2017-10-30 RX ORDER — HEPARIN SODIUM 1000 [USP'U]/ML
INJECTION, SOLUTION INTRAVENOUS; SUBCUTANEOUS AS NEEDED
Status: DISCONTINUED | OUTPATIENT
Start: 2017-10-30 | End: 2017-10-30 | Stop reason: SURG

## 2017-10-30 RX ORDER — FAMOTIDINE 20 MG/1
20 TABLET, FILM COATED ORAL ONCE
Status: DISCONTINUED | OUTPATIENT
Start: 2017-10-30 | End: 2017-10-30

## 2017-10-30 RX ORDER — ALBUMIN, HUMAN INJ 5% 5 %
SOLUTION INTRAVENOUS
Status: COMPLETED
Start: 2017-10-30 | End: 2017-10-30

## 2017-10-30 RX ORDER — ACETAMINOPHEN 325 MG/1
650 TABLET ORAL EVERY 4 HOURS PRN
Status: DISCONTINUED | OUTPATIENT
Start: 2017-10-30 | End: 2017-11-02 | Stop reason: HOSPADM

## 2017-10-30 RX ORDER — CHLORHEXIDINE GLUCONATE 500 MG/1
1 CLOTH TOPICAL EVERY 12 HOURS PRN
Status: DISCONTINUED | OUTPATIENT
Start: 2017-10-30 | End: 2017-10-30 | Stop reason: HOSPADM

## 2017-10-30 RX ORDER — PHENYLEPHRINE HCL IN 0.9% NACL 0.5 MG/5ML
.5-3 SYRINGE (ML) INTRAVENOUS CONTINUOUS PRN
Status: DISCONTINUED | OUTPATIENT
Start: 2017-10-30 | End: 2017-10-31

## 2017-10-30 RX ORDER — NITROGLYCERIN 0.4 MG/1
0.4 TABLET SUBLINGUAL
Status: DISCONTINUED | OUTPATIENT
Start: 2017-10-30 | End: 2017-10-30 | Stop reason: HOSPADM

## 2017-10-30 RX ORDER — ETOMIDATE 2 MG/ML
INJECTION INTRAVENOUS AS NEEDED
Status: DISCONTINUED | OUTPATIENT
Start: 2017-10-30 | End: 2017-10-30 | Stop reason: SURG

## 2017-10-30 RX ORDER — ATORVASTATIN CALCIUM 40 MG/1
40 TABLET, FILM COATED ORAL NIGHTLY
Status: DISCONTINUED | OUTPATIENT
Start: 2017-10-30 | End: 2017-11-02 | Stop reason: HOSPADM

## 2017-10-30 RX ORDER — SUFENTANIL CITRATE 50 UG/ML
INJECTION EPIDURAL; INTRAVENOUS AS NEEDED
Status: DISCONTINUED | OUTPATIENT
Start: 2017-10-30 | End: 2017-10-30 | Stop reason: SURG

## 2017-10-30 RX ORDER — FAMOTIDINE 10 MG/ML
20 INJECTION, SOLUTION INTRAVENOUS 2 TIMES DAILY
Status: DISCONTINUED | OUTPATIENT
Start: 2017-10-30 | End: 2017-10-31

## 2017-10-30 RX ORDER — CHLORHEXIDINE GLUCONATE 0.12 MG/ML
15 RINSE ORAL EVERY 12 HOURS SCHEDULED
Status: DISCONTINUED | OUTPATIENT
Start: 2017-10-30 | End: 2017-10-31

## 2017-10-30 RX ADMIN — PROPOFOL 25 MCG/KG/MIN: 10 INJECTION, EMULSION INTRAVENOUS at 13:40

## 2017-10-30 RX ADMIN — AMIODARONE HYDROCHLORIDE 1 MG/MIN: 1.8 INJECTION, SOLUTION INTRAVENOUS at 20:28

## 2017-10-30 RX ADMIN — SODIUM CHLORIDE, POTASSIUM CHLORIDE, SODIUM LACTATE AND CALCIUM CHLORIDE 20 ML/HR: 600; 310; 30; 20 INJECTION, SOLUTION INTRAVENOUS at 08:55

## 2017-10-30 RX ADMIN — FAMOTIDINE 20 MG: 20 TABLET, FILM COATED ORAL at 09:10

## 2017-10-30 RX ADMIN — SODIUM CHLORIDE: 9 INJECTION, SOLUTION INTRAVENOUS at 10:26

## 2017-10-30 RX ADMIN — PROPOFOL 35 MCG/KG/MIN: 10 INJECTION, EMULSION INTRAVENOUS at 14:02

## 2017-10-30 RX ADMIN — PROTAMINE SULFATE 400 MG: 10 INJECTION, SOLUTION INTRAVENOUS at 13:01

## 2017-10-30 RX ADMIN — LIDOCAINE HYDROCHLORIDE 80 MG: 20 INJECTION, SOLUTION INFILTRATION; PERINEURAL at 10:13

## 2017-10-30 RX ADMIN — SUFENTANIL CITRATE 50 MCG: 50 INJECTION EPIDURAL; INTRAVENOUS at 10:13

## 2017-10-30 RX ADMIN — POTASSIUM CHLORIDE AND DEXTROSE MONOHYDRATE 30 ML/HR: 150; 5 INJECTION, SOLUTION INTRAVENOUS at 14:02

## 2017-10-30 RX ADMIN — AMIODARONE HYDROCHLORIDE 1 MG/MIN: 1.8 INJECTION, SOLUTION INTRAVENOUS at 14:58

## 2017-10-30 RX ADMIN — PHENYLEPHRINE HYDROCHLORIDE 100 MCG: 10 INJECTION INTRAVENOUS at 11:15

## 2017-10-30 RX ADMIN — MORPHINE SULFATE 2 MG: 2 INJECTION, SOLUTION INTRAMUSCULAR; INTRAVENOUS at 18:53

## 2017-10-30 RX ADMIN — ALBUMIN HUMAN 12.5 G: 0.05 INJECTION, SOLUTION INTRAVENOUS at 15:20

## 2017-10-30 RX ADMIN — MIDAZOLAM HYDROCHLORIDE 2 MG: 1 INJECTION, SOLUTION INTRAMUSCULAR; INTRAVENOUS at 10:08

## 2017-10-30 RX ADMIN — VECURONIUM BROMIDE 10 MG: 1 INJECTION, POWDER, LYOPHILIZED, FOR SOLUTION INTRAVENOUS at 11:12

## 2017-10-30 RX ADMIN — SUFENTANIL CITRATE 50 MCG: 50 INJECTION EPIDURAL; INTRAVENOUS at 11:12

## 2017-10-30 RX ADMIN — EPHEDRINE SULFATE 5 MG: 50 INJECTION INTRAMUSCULAR; INTRAVENOUS; SUBCUTANEOUS at 10:52

## 2017-10-30 RX ADMIN — NITROGLYCERIN 25 MCG/MIN: 20 INJECTION INTRAVENOUS at 14:02

## 2017-10-30 RX ADMIN — ATORVASTATIN CALCIUM 40 MG: 40 TABLET, FILM COATED ORAL at 20:27

## 2017-10-30 RX ADMIN — PHENYLEPHRINE HYDROCHLORIDE 200 MCG: 10 INJECTION INTRAVENOUS at 11:29

## 2017-10-30 RX ADMIN — MORPHINE SULFATE 2 MG: 2 INJECTION, SOLUTION INTRAMUSCULAR; INTRAVENOUS at 23:23

## 2017-10-30 RX ADMIN — PHENYLEPHRINE HYDROCHLORIDE 100 MCG: 10 INJECTION INTRAVENOUS at 11:25

## 2017-10-30 RX ADMIN — HYDROCODONE BITARTRATE AND ACETAMINOPHEN 1 TABLET: 7.5; 325 TABLET ORAL at 21:15

## 2017-10-30 RX ADMIN — FAMOTIDINE 20 MG: 20 TABLET, FILM COATED ORAL at 20:27

## 2017-10-30 RX ADMIN — CHLORHEXIDINE GLUCONATE 15 ML: 1.2 RINSE ORAL at 09:10

## 2017-10-30 RX ADMIN — MIDAZOLAM HYDROCHLORIDE 2 MG: 1 INJECTION, SOLUTION INTRAMUSCULAR; INTRAVENOUS at 12:48

## 2017-10-30 RX ADMIN — PHENYLEPHRINE HYDROCHLORIDE 100 MCG: 10 INJECTION INTRAVENOUS at 11:22

## 2017-10-30 RX ADMIN — DOBUTAMINE HYDROCHLORIDE 2 MCG/KG/MIN: 100 INJECTION INTRAVENOUS at 16:40

## 2017-10-30 RX ADMIN — PHENYLEPHRINE HYDROCHLORIDE 100 MCG: 10 INJECTION INTRAVENOUS at 13:18

## 2017-10-30 RX ADMIN — VECURONIUM BROMIDE 5 MG: 1 INJECTION, POWDER, LYOPHILIZED, FOR SOLUTION INTRAVENOUS at 13:15

## 2017-10-30 RX ADMIN — POTASSIUM CHLORIDE AND DEXTROSE MONOHYDRATE 30 ML/HR: 150; 5 INJECTION, SOLUTION INTRAVENOUS at 15:31

## 2017-10-30 RX ADMIN — SODIUM CHLORIDE, POTASSIUM CHLORIDE, SODIUM LACTATE AND CALCIUM CHLORIDE: 600; 310; 30; 20 INJECTION, SOLUTION INTRAVENOUS at 10:05

## 2017-10-30 RX ADMIN — ETOMIDATE 40 MG: 2 INJECTION INTRAVENOUS at 10:13

## 2017-10-30 RX ADMIN — CHLORHEXIDINE GLUCONATE 15 ML: 1.2 RINSE ORAL at 20:28

## 2017-10-30 RX ADMIN — FENTANYL CITRATE 25 MCG: 50 INJECTION, SOLUTION INTRAMUSCULAR; INTRAVENOUS at 21:49

## 2017-10-30 RX ADMIN — ROCURONIUM BROMIDE 100 MG: 10 INJECTION INTRAVENOUS at 10:13

## 2017-10-30 RX ADMIN — CEFUROXIME 1.5 G: 90 INJECTION, POWDER, FOR SOLUTION INTRAVENOUS at 13:37

## 2017-10-30 RX ADMIN — PHENYLEPHRINE HYDROCHLORIDE 100 MCG: 10 INJECTION INTRAVENOUS at 12:55

## 2017-10-30 RX ADMIN — SUFENTANIL CITRATE 100 MCG: 50 INJECTION EPIDURAL; INTRAVENOUS at 10:44

## 2017-10-30 RX ADMIN — VECURONIUM BROMIDE 5 MG: 1 INJECTION, POWDER, LYOPHILIZED, FOR SOLUTION INTRAVENOUS at 12:45

## 2017-10-30 RX ADMIN — METOPROLOL TARTRATE 12.5 MG: 25 TABLET ORAL at 09:16

## 2017-10-30 RX ADMIN — HYDROCODONE BITARTRATE AND ACETAMINOPHEN 1 TABLET: 7.5; 325 TABLET ORAL at 16:51

## 2017-10-30 RX ADMIN — ALBUMIN HUMAN 250 ML: 0.05 INJECTION, SOLUTION INTRAVENOUS at 23:27

## 2017-10-30 RX ADMIN — CEFUROXIME 1.5 G: 90 INJECTION, POWDER, FOR SOLUTION INTRAVENOUS at 10:26

## 2017-10-30 RX ADMIN — TRANEXAMIC ACID 1428 MG: 100 INJECTION, SOLUTION INTRAVENOUS at 11:07

## 2017-10-30 RX ADMIN — MORPHINE SULFATE 2 MG: 2 INJECTION, SOLUTION INTRAMUSCULAR; INTRAVENOUS at 17:59

## 2017-10-30 RX ADMIN — ALBUMIN HUMAN 500 ML: 0.05 INJECTION, SOLUTION INTRAVENOUS at 16:13

## 2017-10-30 RX ADMIN — MIDAZOLAM HYDROCHLORIDE 1 MG: 1 INJECTION, SOLUTION INTRAMUSCULAR; INTRAVENOUS at 11:12

## 2017-10-30 RX ADMIN — HEPARIN SODIUM 41000 UNITS: 1000 INJECTION, SOLUTION INTRAVENOUS; SUBCUTANEOUS at 11:11

## 2017-10-30 RX ADMIN — PROTAMINE SULFATE 50 MG: 10 INJECTION, SOLUTION INTRAVENOUS at 14:59

## 2017-10-30 RX ADMIN — TRANEXAMIC ACID 16 MG/KG/HR: 100 INJECTION, SOLUTION INTRAVENOUS at 10:34

## 2017-10-30 RX ADMIN — SODIUM CHLORIDE 1.6 UNITS/HR: 9 INJECTION, SOLUTION INTRAVENOUS at 18:22

## 2017-10-30 RX ADMIN — MORPHINE SULFATE 2 MG: 2 INJECTION, SOLUTION INTRAMUSCULAR; INTRAVENOUS at 20:27

## 2017-10-30 RX ADMIN — MUPIROCIN 1 APPLICATION: 20 OINTMENT TOPICAL at 09:11

## 2017-10-30 RX ADMIN — CEFUROXIME 1.5 G: 1.5 INJECTION, POWDER, FOR SOLUTION INTRAVENOUS at 22:25

## 2017-10-30 RX ADMIN — SUFENTANIL CITRATE 50 MCG: 50 INJECTION EPIDURAL; INTRAVENOUS at 13:00

## 2017-10-30 RX ADMIN — ASPIRIN 325 MG ORAL TABLET 325 MG: 325 PILL ORAL at 16:51

## 2017-10-30 RX ADMIN — LIDOCAINE HYDROCHLORIDE 0.5 ML: 10 INJECTION, SOLUTION EPIDURAL; INFILTRATION; INTRACAUDAL; PERINEURAL at 08:54

## 2017-10-30 RX ADMIN — PHENYLEPHRINE HYDROCHLORIDE 100 MCG: 10 INJECTION INTRAVENOUS at 10:50

## 2017-10-30 NOTE — ANESTHESIA PREPROCEDURE EVALUATION
Anesthesia Evaluation     Patient summary reviewed and Nursing notes reviewed   no history of anesthetic complications:  NPO Solid Status: > 8 hours  NPO Liquid Status: > 8 hours     Airway   Mallampati: III  TM distance: >3 FB  Neck ROM: full  possible difficult intubation  Dental - normal exam     Pulmonary - negative pulmonary ROS    breath sounds clear to auscultation  Cardiovascular   Exercise tolerance: good (4-7 METS)    ECG reviewed  Rhythm: regular  Rate: normal    (+) hypertension, CAD,       Neuro/Psych- negative ROS  GI/Hepatic/Renal/Endo    (+)  GERD well controlled,   (-) liver disease, no renal disease, diabetes, hypothyroidism    Musculoskeletal     (+) arthralgias, back pain,   Abdominal    Substance History - negative use     OB/GYN          Other   (+) arthritis                                   Anesthesia Plan    ASA 4     general   (Labs/studies reviewed  Risk/Benefits discussed with patient as well as ICU post op ventilation.  Placement of a-line, AMINATA, and PAC also discussed along with risks/benefits  )  intravenous induction   Anesthetic plan and risks discussed with patient.  Use of blood products discussed with patient  Consented to blood products.

## 2017-10-30 NOTE — ANESTHESIA PROCEDURE NOTES
Airway  Urgency: elective    Date/Time: 10/30/2017 10:14 AM  Airway not difficult    General Information and Staff    Patient location during procedure: OR  Anesthesiologist: LEVAR YAP    Indications and Patient Condition  Indications for airway management: airway protection    Preoxygenated: yes  Mask difficulty assessment: 2 - vent by mask + OA or adjuvant +/- NMBA    Final Airway Details  Final airway type: endotracheal airway      Successful airway: ETT  Cuffed: yes   Successful intubation technique: direct laryngoscopy  Facilitating devices/methods: intubating stylet  Endotracheal tube insertion site: oral  Blade: Rogelio  Blade size: #4  ETT size: 8.0 mm  Cormack-Lehane Classification: grade IIb - view of arytenoids or posterior of glottis only  Placement verified by: chest auscultation and capnometry   Cuff volume (mL): 8  Measured from: teeth  ETT to teeth (cm): 24  Number of attempts at approach: 1    Additional Comments  Atraumatic

## 2017-10-30 NOTE — ANESTHESIA PROCEDURE NOTES
Arterial Line    Patient location during procedure: pre-op  Start time: 10/30/2017 9:45 AM   Performed By   Anesthesiologist: LEAVR YAP  Preanesthetic Checklist  Completed: patient identified, site marked, surgical consent, pre-op evaluation, timeout performed, IV checked, risks and benefits discussed and monitors and equipment checked  Arterial Line Prep   Sterile Tech: cap, gloves, mask and sterile barriers  Prep: ChloraPrep  Patient monitoring: blood pressure monitoring, continuous pulse oximetry and EKG  Arterial Line Procedure   Laterality:right  Location:  radial artery  Catheter size: 20 G   Guidance: ultrasound guided  PROCEDURE NOTE/ULTRASOUND INTERPRETATION.  Using ultrasound guidance the potential vascular sites for insertion of the catheter were visualized to determine the patency of the vessel to be used for vascular access.  After selecting the appropriate site for insertion, the needle was visualized under ultrasound being inserted into the radial artery, followed by ultrasound confirmation of wire and catheter placement. There were no abnormalities seen on ultrasound; an image was taken; and the patient tolerated the procedure with no complications.   Number of attempts: 2  Successful placement: yes          Post Assessment   Dressing Type: line sutured, occlusive dressing applied, secured with tape and wrist guard applied.   Complications no  Circ/Move/Sens Assessment: normal.   Patient Tolerance: patient tolerated the procedure well with no apparent complications

## 2017-10-30 NOTE — ANESTHESIA PROCEDURE NOTES
Central Line    Patient location during procedure: OR  Start time: 10/30/2017 10:26 AM  Staff  Anesthesiologist: LEVAR YAP  Preanesthetic Checklist  Completed: patient identified, site marked, surgical consent, pre-op evaluation, timeout performed, IV checked, risks and benefits discussed and monitors and equipment checked  Central Line Prep  Sterile Tech:cap, gloves, gown, mask and sterile barriers  Prep: chloraprep  Patient monitoring: blood pressure monitoring, continuous pulse oximetry and EKG  Central Line Procedure  Laterality:right  Location:internal jugular  Catheter Type:Cordis and Paxinos-Shaniqua  Catheter Size:9 Fr  Guidance:landmark technique and palpation technique  Assessment  Post procedure:biopatch applied, line sutured, occlusive dressing applied and secured with tape  Assessement:blood return through all ports, free fluid flow, Jim Test and chest x-ray ordered  Complications:no  Patient Tolerance:patient tolerated the procedure well with no apparent complications

## 2017-10-30 NOTE — ANESTHESIA POSTPROCEDURE EVALUATION
Patient: Clarence Hylton    Procedure Summary     Date Anesthesia Start Anesthesia Stop Room / Location    10/30/17 1005 1404 [facility] 09 / [facility]       Procedure Diagnosis Surgeon Provider    CORONARY ARTERY BYPASS X 5 GRAFTING with ENDOSCOPIC VEIN HARVEST,  (N/A Chest) CAD, multiple vessel  (CAD, multiple vessel [I25.10]) MD Vilma Logan MD          Anesthesia Type: general  Last vitals  BP   146/97 (140/95 in the left arm) (10/30/17 0905)   Temp   97.5 °F (36.4 °C) (10/30/17 0905)   Pulse   81 (10/30/17 0905)   Resp   16 (10/30/17 0905)     SpO2   96 % (10/30/17 0905)     Post Anesthesia Care and Evaluation    Patient location during evaluation: ICU  Patient participation: complete - patient cannot participate  Level of consciousness: obtunded/minimal responses  Pain score: 0  Pain management: adequate  Airway patency: patent  Anesthetic complications: No anesthetic complications  PONV Status: none  Cardiovascular status: acceptable  Respiratory status: acceptable  Hydration status: acceptable

## 2017-10-30 NOTE — ANESTHESIA PROCEDURE NOTES
Procedure Performed: Emergent/Open-Heart Anesthesia AMINATA      Start Time:  10/30/2017 10:30 AM       End Time:      Preanesthesia Checklist:  Patient identified, IV assessed, risks and benefits discussed, monitors and equipment assessed, procedure being performed at surgeon's request and anesthesia consent obtained.    General Procedure Information  Diagnostic Indications for Echo:  hemodynamic monitoring  Physician Requesting Echo: MONSE HERNANDEZ  Location performed:  OR  Intubated  Bite block not placed  Heart visualized  Probe Insertion:  Easy  Probe Type:  Multiplane  Modalities:  Color flow mapping, 2D only, continuous wave Doppler and pulse wave Doppler    Echocardiographic and Doppler Measurements    Ventricles    Right Ventricle:  Cavity size normal.  Hypertrophy not present.  Thrombus not present.    Left Ventricle:  Cavity size normal.  Hypertrophy not present.  Thrombus not present.  Global Function normal.  Ejection Fraction 60%.      Ventricular Regional Function:  1- Basal Anteroseptal:  normal  2- Basal Anterior:  normal  3- Basal Anterolateral:  normal  4- Basal Inferolateral:  normal  5- Basal Inferior:  normal  6- Basal Inferoseptal:  normal  7- Mid Anteroseptal:  normal  8- Mid Anterior:  normal  9- Mid Anterolateral:  normal  10- Mid Inferolateral:  normal  11- Mid Inferior:  normal  12- Mid Inferoseptal:  normal  13- Apical Anterior:  normal  14- Apical Lateral:  normal  15- Apical Inferior:  normal  16- Apical Septal:  normal  17- [place]:  normal      Valves    Aortic Valve:  Annulus normal.  Regurgitation absent.  Leaflets normal.  Leaflet motions normal.      Mitral Valve:  Annulus normal.  Stenosis not present.  Regurgitation mild.  Leaflets normal.  Leaflet motions normal.      Tricuspid Valve:  Annulus normal.  Stenosis not present.  Regurgitation trace.  Leaflets normal.  Leaflet motions normal.          Aorta    Ascending Aorta:  Size normal.  Dissection not present.  Plaque thickness  less than 3 mm.  Mobile plaque not present.    Aortic Arch:  Size normal.  Dissection not present.  Plaque thickness less than 3 mm.  Mobile plaque not present.    Descending Aorta:  Size normal.  Dissection not present.  Plaque thickness less than 3 mm.  Mobile plaque not present.          Atria    Right Atrium:  Size normal.  Spontaneous echo contrast not present.  Thrombus not present.  Tumor not present.  Device not present.      Left Atrium:  Size normal.  Spontaneous echo contrast not present.  Thrombus not present.  Tumor not present.  Device not present.    Left atrial appendage normal.      Septa    Atrial Septum:  Intra-atrial septal morphology normal.      Ventricular Septum:  Intra-ventricular septum morphology normal.          Other Findings  Pericardium:  normal  Pleural Effusion:  none  Pulmonary Arteries:  normal  Pulmonary Venous Flow:  blunted (decreased) systolic flow    Anesthesia Information  Performed Personally  Anesthesiologist:  LEVAR YAP      Echocardiogram Comments:       Findings discussed with Dr. Starr

## 2017-10-31 ENCOUNTER — APPOINTMENT (OUTPATIENT)
Dept: GENERAL RADIOLOGY | Facility: HOSPITAL | Age: 47
End: 2017-10-31

## 2017-10-31 PROBLEM — L40.9 PSORIASIS: Status: ACTIVE | Noted: 2017-10-31

## 2017-10-31 PROBLEM — I25.10 CAD, MULTIPLE VESSEL: Status: ACTIVE | Noted: 2017-10-27

## 2017-10-31 PROBLEM — J95.821 RESPIRATORY FAILURE, POST-OPERATIVE (HCC): Status: RESOLVED | Noted: 2017-10-30 | Resolved: 2017-10-31

## 2017-10-31 PROBLEM — K21.9 GERD (GASTROESOPHAGEAL REFLUX DISEASE): Status: ACTIVE | Noted: 2017-10-31

## 2017-10-31 LAB
ABO + RH BLD: NORMAL
ABO + RH BLD: NORMAL
ALBUMIN SERPL-MCNC: 4.1 G/DL (ref 3.2–4.8)
ANION GAP SERPL CALCULATED.3IONS-SCNC: 7 MMOL/L (ref 3–11)
BASOPHILS # BLD AUTO: 0.01 10*3/MM3 (ref 0–0.2)
BASOPHILS NFR BLD AUTO: 0.1 % (ref 0–1)
BH BB BLOOD EXPIRATION DATE: NORMAL
BH BB BLOOD EXPIRATION DATE: NORMAL
BH BB BLOOD TYPE BARCODE: 600
BH BB BLOOD TYPE BARCODE: 7300
BH BB DISPENSE STATUS: NORMAL
BH BB DISPENSE STATUS: NORMAL
BH BB PRODUCT CODE: NORMAL
BH BB PRODUCT CODE: NORMAL
BH BB UNIT NUMBER: NORMAL
BH BB UNIT NUMBER: NORMAL
BUN BLD-MCNC: 9 MG/DL (ref 9–23)
BUN/CREAT SERPL: 12.9 (ref 7–25)
CALCIUM SPEC-SCNC: 8.3 MG/DL (ref 8.7–10.4)
CHLORIDE SERPL-SCNC: 102 MMOL/L (ref 99–109)
CO2 SERPL-SCNC: 24 MMOL/L (ref 20–31)
CREAT BLD-MCNC: 0.7 MG/DL (ref 0.6–1.3)
DEPRECATED RDW RBC AUTO: 45.3 FL (ref 37–54)
EOSINOPHIL # BLD AUTO: 0.03 10*3/MM3 (ref 0–0.3)
EOSINOPHIL NFR BLD AUTO: 0.2 % (ref 0–3)
ERYTHROCYTE [DISTWIDTH] IN BLOOD BY AUTOMATED COUNT: 14.1 % (ref 11.3–14.5)
GFR SERPL CREATININE-BSD FRML MDRD: 121 ML/MIN/1.73
GLUCOSE BLD-MCNC: 151 MG/DL (ref 70–100)
GLUCOSE BLDC GLUCOMTR-MCNC: 102 MG/DL (ref 70–130)
GLUCOSE BLDC GLUCOMTR-MCNC: 105 MG/DL (ref 70–130)
GLUCOSE BLDC GLUCOMTR-MCNC: 111 MG/DL (ref 70–130)
GLUCOSE BLDC GLUCOMTR-MCNC: 123 MG/DL (ref 70–130)
GLUCOSE BLDC GLUCOMTR-MCNC: 124 MG/DL (ref 70–130)
GLUCOSE BLDC GLUCOMTR-MCNC: 124 MG/DL (ref 70–130)
GLUCOSE BLDC GLUCOMTR-MCNC: 128 MG/DL (ref 70–130)
GLUCOSE BLDC GLUCOMTR-MCNC: 131 MG/DL (ref 70–130)
GLUCOSE BLDC GLUCOMTR-MCNC: 133 MG/DL (ref 70–130)
GLUCOSE BLDC GLUCOMTR-MCNC: 136 MG/DL (ref 70–130)
GLUCOSE BLDC GLUCOMTR-MCNC: 140 MG/DL (ref 70–130)
GLUCOSE BLDC GLUCOMTR-MCNC: 142 MG/DL (ref 70–130)
GLUCOSE BLDC GLUCOMTR-MCNC: 146 MG/DL (ref 70–130)
GLUCOSE BLDC GLUCOMTR-MCNC: 149 MG/DL (ref 70–130)
GLUCOSE BLDC GLUCOMTR-MCNC: 150 MG/DL (ref 70–130)
GLUCOSE BLDC GLUCOMTR-MCNC: 153 MG/DL (ref 70–130)
GLUCOSE BLDC GLUCOMTR-MCNC: 92 MG/DL (ref 70–130)
HCT VFR BLD AUTO: 35.2 % (ref 38.9–50.9)
HCT VFR BLD AUTO: 36.4 % (ref 38.9–50.9)
HGB BLD-MCNC: 11.5 G/DL (ref 13.1–17.5)
HGB BLD-MCNC: 11.7 G/DL (ref 13.1–17.5)
IMM GRANULOCYTES # BLD: 0.03 10*3/MM3 (ref 0–0.03)
IMM GRANULOCYTES NFR BLD: 0.2 % (ref 0–0.6)
INR PPP: 1.01
LYMPHOCYTES # BLD AUTO: 1.2 10*3/MM3 (ref 0.6–4.8)
LYMPHOCYTES NFR BLD AUTO: 9.3 % (ref 24–44)
MAGNESIUM SERPL-MCNC: 2 MG/DL (ref 1.3–2.7)
MCH RBC QN AUTO: 28 PG (ref 27–31)
MCHC RBC AUTO-ENTMCNC: 32.1 G/DL (ref 32–36)
MCV RBC AUTO: 87.1 FL (ref 80–99)
MONOCYTES # BLD AUTO: 1.07 10*3/MM3 (ref 0–1)
MONOCYTES NFR BLD AUTO: 8.3 % (ref 0–12)
NEUTROPHILS # BLD AUTO: 10.52 10*3/MM3 (ref 1.5–8.3)
NEUTROPHILS NFR BLD AUTO: 81.9 % (ref 41–71)
PHOSPHATE SERPL-MCNC: 2.9 MG/DL (ref 2.4–5.1)
PLATELET # BLD AUTO: 262 10*3/MM3 (ref 150–450)
PMV BLD AUTO: 9.7 FL (ref 6–12)
POTASSIUM BLD-SCNC: 4.2 MMOL/L (ref 3.5–5.5)
PROTHROMBIN TIME: 11 SECONDS (ref 9.6–11.5)
RBC # BLD AUTO: 4.18 10*6/MM3 (ref 4.2–5.76)
SODIUM BLD-SCNC: 133 MMOL/L (ref 132–146)
UNIT  ABO: NORMAL
UNIT  ABO: NORMAL
UNIT  RH: NORMAL
UNIT  RH: NORMAL
WBC NRBC COR # BLD: 12.86 10*3/MM3 (ref 3.5–10.8)

## 2017-10-31 PROCEDURE — 97110 THERAPEUTIC EXERCISES: CPT

## 2017-10-31 PROCEDURE — 85610 PROTHROMBIN TIME: CPT | Performed by: PHYSICIAN ASSISTANT

## 2017-10-31 PROCEDURE — 85018 HEMOGLOBIN: CPT | Performed by: THORACIC SURGERY (CARDIOTHORACIC VASCULAR SURGERY)

## 2017-10-31 PROCEDURE — 99233 SBSQ HOSP IP/OBS HIGH 50: CPT | Performed by: INTERNAL MEDICINE

## 2017-10-31 PROCEDURE — 25010000002 MORPHINE SULFATE (PF) 2 MG/ML SOLUTION: Performed by: THORACIC SURGERY (CARDIOTHORACIC VASCULAR SURGERY)

## 2017-10-31 PROCEDURE — 25010000002 KETOROLAC TROMETHAMINE PER 15 MG: Performed by: INTERNAL MEDICINE

## 2017-10-31 PROCEDURE — 83735 ASSAY OF MAGNESIUM: CPT | Performed by: PHYSICIAN ASSISTANT

## 2017-10-31 PROCEDURE — 85014 HEMATOCRIT: CPT | Performed by: THORACIC SURGERY (CARDIOTHORACIC VASCULAR SURGERY)

## 2017-10-31 PROCEDURE — 85025 COMPLETE CBC W/AUTO DIFF WBC: CPT | Performed by: PHYSICIAN ASSISTANT

## 2017-10-31 PROCEDURE — 80069 RENAL FUNCTION PANEL: CPT | Performed by: PHYSICIAN ASSISTANT

## 2017-10-31 PROCEDURE — 93010 ELECTROCARDIOGRAM REPORT: CPT | Performed by: INTERNAL MEDICINE

## 2017-10-31 PROCEDURE — 25010000002 AMIODARONE IN DEXTROSE 5% 360-4.14 MG/200ML-% SOLUTION: Performed by: THORACIC SURGERY (CARDIOTHORACIC VASCULAR SURGERY)

## 2017-10-31 PROCEDURE — 99024 POSTOP FOLLOW-UP VISIT: CPT | Performed by: THORACIC SURGERY (CARDIOTHORACIC VASCULAR SURGERY)

## 2017-10-31 PROCEDURE — 94799 UNLISTED PULMONARY SVC/PX: CPT

## 2017-10-31 PROCEDURE — 25010000002 ONDANSETRON PER 1 MG: Performed by: PHYSICIAN ASSISTANT

## 2017-10-31 PROCEDURE — 82962 GLUCOSE BLOOD TEST: CPT

## 2017-10-31 PROCEDURE — 93005 ELECTROCARDIOGRAM TRACING: CPT | Performed by: PHYSICIAN ASSISTANT

## 2017-10-31 PROCEDURE — 97163 PT EVAL HIGH COMPLEX 45 MIN: CPT

## 2017-10-31 PROCEDURE — 25010000002 CEFUROXIME: Performed by: PHYSICIAN ASSISTANT

## 2017-10-31 PROCEDURE — 71010 HC CHEST PA OR AP: CPT

## 2017-10-31 RX ORDER — KETOROLAC TROMETHAMINE 30 MG/ML
30 INJECTION, SOLUTION INTRAMUSCULAR; INTRAVENOUS EVERY 8 HOURS SCHEDULED
Status: DISCONTINUED | OUTPATIENT
Start: 2017-10-31 | End: 2017-11-02 | Stop reason: HOSPADM

## 2017-10-31 RX ORDER — HYDROCODONE BITARTRATE AND ACETAMINOPHEN 10; 325 MG/1; MG/1
1 TABLET ORAL EVERY 4 HOURS PRN
Status: DISCONTINUED | OUTPATIENT
Start: 2017-10-31 | End: 2017-11-01

## 2017-10-31 RX ORDER — HYDROCODONE BITARTRATE AND ACETAMINOPHEN 5; 325 MG/1; MG/1
1 TABLET ORAL EVERY 4 HOURS PRN
Status: DISCONTINUED | OUTPATIENT
Start: 2017-10-31 | End: 2017-11-02 | Stop reason: HOSPADM

## 2017-10-31 RX ORDER — PANTOPRAZOLE SODIUM 40 MG/1
40 TABLET, DELAYED RELEASE ORAL
Status: DISCONTINUED | OUTPATIENT
Start: 2017-11-01 | End: 2017-11-02 | Stop reason: HOSPADM

## 2017-10-31 RX ORDER — MORPHINE SULFATE 2 MG/ML
2 INJECTION, SOLUTION INTRAMUSCULAR; INTRAVENOUS
Status: DISCONTINUED | OUTPATIENT
Start: 2017-10-31 | End: 2017-11-02 | Stop reason: HOSPADM

## 2017-10-31 RX ADMIN — HYDROCODONE BITARTRATE AND ACETAMINOPHEN 1 TABLET: 7.5; 325 TABLET ORAL at 08:21

## 2017-10-31 RX ADMIN — ACETAMINOPHEN 650 MG: 325 TABLET ORAL at 16:09

## 2017-10-31 RX ADMIN — HYDROCODONE BITARTRATE AND ACETAMINOPHEN 1 TABLET: 5; 325 TABLET ORAL at 17:02

## 2017-10-31 RX ADMIN — MORPHINE SULFATE 2 MG: 2 INJECTION, SOLUTION INTRAMUSCULAR; INTRAVENOUS at 07:18

## 2017-10-31 RX ADMIN — AMIODARONE HYDROCHLORIDE 0.5 MG/MIN: 1.8 INJECTION, SOLUTION INTRAVENOUS at 01:17

## 2017-10-31 RX ADMIN — HYDROCODONE BITARTRATE AND ACETAMINOPHEN 1 TABLET: 10; 325 TABLET ORAL at 21:04

## 2017-10-31 RX ADMIN — HYDROCODONE BITARTRATE AND ACETAMINOPHEN 1 TABLET: 7.5; 325 TABLET ORAL at 01:16

## 2017-10-31 RX ADMIN — FAMOTIDINE 20 MG: 20 TABLET, FILM COATED ORAL at 08:14

## 2017-10-31 RX ADMIN — CEFUROXIME 1.5 G: 1.5 INJECTION, POWDER, FOR SOLUTION INTRAVENOUS at 16:07

## 2017-10-31 RX ADMIN — AMIODARONE HYDROCHLORIDE 0.5 MG/MIN: 1.8 INJECTION, SOLUTION INTRAVENOUS at 03:47

## 2017-10-31 RX ADMIN — KETOROLAC TROMETHAMINE 30 MG: 30 INJECTION, SOLUTION INTRAMUSCULAR at 10:10

## 2017-10-31 RX ADMIN — ATORVASTATIN CALCIUM 40 MG: 40 TABLET, FILM COATED ORAL at 21:03

## 2017-10-31 RX ADMIN — CEFUROXIME 1.5 G: 1.5 INJECTION, POWDER, FOR SOLUTION INTRAVENOUS at 21:17

## 2017-10-31 RX ADMIN — MORPHINE SULFATE 2 MG: 2 INJECTION, SOLUTION INTRAMUSCULAR; INTRAVENOUS at 03:25

## 2017-10-31 RX ADMIN — ONDANSETRON 4 MG: 2 INJECTION INTRAMUSCULAR; INTRAVENOUS at 12:47

## 2017-10-31 RX ADMIN — Medication 2 TABLET: at 17:02

## 2017-10-31 RX ADMIN — HYDROCODONE BITARTRATE AND ACETAMINOPHEN 1 TABLET: 5; 325 TABLET ORAL at 12:45

## 2017-10-31 RX ADMIN — CEFUROXIME 1.5 G: 1.5 INJECTION, POWDER, FOR SOLUTION INTRAVENOUS at 06:00

## 2017-10-31 RX ADMIN — ASPIRIN 325 MG: 325 TABLET, DELAYED RELEASE ORAL at 08:14

## 2017-10-31 RX ADMIN — KETOROLAC TROMETHAMINE 30 MG: 30 INJECTION, SOLUTION INTRAMUSCULAR at 21:03

## 2017-10-31 RX ADMIN — Medication 2 TABLET: at 08:14

## 2017-10-31 RX ADMIN — CHLORHEXIDINE GLUCONATE 15 ML: 1.2 RINSE ORAL at 08:15

## 2017-11-01 ENCOUNTER — APPOINTMENT (OUTPATIENT)
Dept: GENERAL RADIOLOGY | Facility: HOSPITAL | Age: 47
End: 2017-11-01

## 2017-11-01 LAB
ABO + RH BLD: NORMAL
ABO + RH BLD: NORMAL
ANION GAP SERPL CALCULATED.3IONS-SCNC: 5 MMOL/L (ref 3–11)
BACTERIA SPEC AEROBE CULT: NORMAL
BH BB BLOOD EXPIRATION DATE: NORMAL
BH BB BLOOD EXPIRATION DATE: NORMAL
BH BB BLOOD TYPE BARCODE: 9500
BH BB BLOOD TYPE BARCODE: 9500
BH BB DISPENSE STATUS: NORMAL
BH BB DISPENSE STATUS: NORMAL
BH BB PRODUCT CODE: NORMAL
BH BB PRODUCT CODE: NORMAL
BH BB UNIT NUMBER: NORMAL
BH BB UNIT NUMBER: NORMAL
BUN BLD-MCNC: 8 MG/DL (ref 9–23)
BUN/CREAT SERPL: 13.3 (ref 7–25)
CALCIUM SPEC-SCNC: 8.8 MG/DL (ref 8.7–10.4)
CHLORIDE SERPL-SCNC: 102 MMOL/L (ref 99–109)
CO2 SERPL-SCNC: 26 MMOL/L (ref 20–31)
CREAT BLD-MCNC: 0.6 MG/DL (ref 0.6–1.3)
DEPRECATED RDW RBC AUTO: 46.3 FL (ref 37–54)
ERYTHROCYTE [DISTWIDTH] IN BLOOD BY AUTOMATED COUNT: 14.2 % (ref 11.3–14.5)
GFR SERPL CREATININE-BSD FRML MDRD: 144 ML/MIN/1.73
GLUCOSE BLD-MCNC: 114 MG/DL (ref 70–100)
GLUCOSE BLDC GLUCOMTR-MCNC: 110 MG/DL (ref 70–130)
GLUCOSE BLDC GLUCOMTR-MCNC: 118 MG/DL (ref 70–130)
GLUCOSE BLDC GLUCOMTR-MCNC: 84 MG/DL (ref 70–130)
GLUCOSE BLDC GLUCOMTR-MCNC: 93 MG/DL (ref 70–130)
HCT VFR BLD AUTO: 33.7 % (ref 38.9–50.9)
HGB BLD-MCNC: 11 G/DL (ref 13.1–17.5)
MCH RBC QN AUTO: 28.6 PG (ref 27–31)
MCHC RBC AUTO-ENTMCNC: 32.6 G/DL (ref 32–36)
MCV RBC AUTO: 87.8 FL (ref 80–99)
PLATELET # BLD AUTO: 171 10*3/MM3 (ref 150–450)
PMV BLD AUTO: 9.6 FL (ref 6–12)
POTASSIUM BLD-SCNC: 3.9 MMOL/L (ref 3.5–5.5)
RBC # BLD AUTO: 3.84 10*6/MM3 (ref 4.2–5.76)
SODIUM BLD-SCNC: 133 MMOL/L (ref 132–146)
UNIT  ABO: NORMAL
UNIT  ABO: NORMAL
UNIT  RH: NORMAL
UNIT  RH: NORMAL
WBC NRBC COR # BLD: 9.79 10*3/MM3 (ref 3.5–10.8)

## 2017-11-01 PROCEDURE — 63710000001 INSULIN LISPRO (HUMAN) PER 5 UNITS: Performed by: INTERNAL MEDICINE

## 2017-11-01 PROCEDURE — 97110 THERAPEUTIC EXERCISES: CPT

## 2017-11-01 PROCEDURE — 93005 ELECTROCARDIOGRAM TRACING: CPT | Performed by: PHYSICIAN ASSISTANT

## 2017-11-01 PROCEDURE — 82962 GLUCOSE BLOOD TEST: CPT

## 2017-11-01 PROCEDURE — 93010 ELECTROCARDIOGRAM REPORT: CPT | Performed by: INTERNAL MEDICINE

## 2017-11-01 PROCEDURE — 94799 UNLISTED PULMONARY SVC/PX: CPT

## 2017-11-01 PROCEDURE — 80048 BASIC METABOLIC PNL TOTAL CA: CPT | Performed by: PHYSICIAN ASSISTANT

## 2017-11-01 PROCEDURE — 25010000002 MORPHINE SULFATE (PF) 2 MG/ML SOLUTION: Performed by: INTERNAL MEDICINE

## 2017-11-01 PROCEDURE — 25010000002 KETOROLAC TROMETHAMINE PER 15 MG: Performed by: INTERNAL MEDICINE

## 2017-11-01 PROCEDURE — 25010000002 CEFUROXIME: Performed by: PHYSICIAN ASSISTANT

## 2017-11-01 PROCEDURE — 99233 SBSQ HOSP IP/OBS HIGH 50: CPT | Performed by: INTERNAL MEDICINE

## 2017-11-01 PROCEDURE — 71010 HC CHEST PA OR AP: CPT

## 2017-11-01 PROCEDURE — 25010000002 HEPARIN (PORCINE) PER 1000 UNITS: Performed by: INTERNAL MEDICINE

## 2017-11-01 PROCEDURE — 25010000002 FUROSEMIDE PER 20 MG: Performed by: PHYSICIAN ASSISTANT

## 2017-11-01 PROCEDURE — 99024 POSTOP FOLLOW-UP VISIT: CPT | Performed by: THORACIC SURGERY (CARDIOTHORACIC VASCULAR SURGERY)

## 2017-11-01 PROCEDURE — 85027 COMPLETE CBC AUTOMATED: CPT | Performed by: PHYSICIAN ASSISTANT

## 2017-11-01 RX ORDER — CLOPIDOGREL BISULFATE 75 MG/1
150 TABLET ORAL ONCE
Status: COMPLETED | OUTPATIENT
Start: 2017-11-01 | End: 2017-11-01

## 2017-11-01 RX ORDER — FUROSEMIDE 10 MG/ML
40 INJECTION INTRAMUSCULAR; INTRAVENOUS ONCE
Status: COMPLETED | OUTPATIENT
Start: 2017-11-01 | End: 2017-11-01

## 2017-11-01 RX ORDER — TETRAHYDROZOLINE HCL 0.05 %
1 DROPS OPHTHALMIC (EYE) 4 TIMES DAILY PRN
Status: DISCONTINUED | OUTPATIENT
Start: 2017-11-01 | End: 2017-11-02 | Stop reason: HOSPADM

## 2017-11-01 RX ORDER — HEPARIN SODIUM 5000 [USP'U]/ML
5000 INJECTION, SOLUTION INTRAVENOUS; SUBCUTANEOUS EVERY 8 HOURS SCHEDULED
Status: DISCONTINUED | OUTPATIENT
Start: 2017-11-01 | End: 2017-11-02 | Stop reason: HOSPADM

## 2017-11-01 RX ORDER — SODIUM CHLORIDE 0.9 % (FLUSH) 0.9 %
1-10 SYRINGE (ML) INJECTION EVERY 8 HOURS
Status: DISCONTINUED | OUTPATIENT
Start: 2017-11-01 | End: 2017-11-02

## 2017-11-01 RX ORDER — CLOPIDOGREL BISULFATE 75 MG/1
75 TABLET ORAL DAILY
Status: DISCONTINUED | OUTPATIENT
Start: 2017-11-02 | End: 2017-11-02 | Stop reason: HOSPADM

## 2017-11-01 RX ORDER — HYDROCODONE BITARTRATE AND ACETAMINOPHEN 5; 325 MG/1; MG/1
1 TABLET ORAL EVERY 4 HOURS PRN
Qty: 15 TABLET | Refills: 0 | Status: SHIPPED | OUTPATIENT
Start: 2017-11-01 | End: 2017-11-02

## 2017-11-01 RX ADMIN — ATORVASTATIN CALCIUM 40 MG: 40 TABLET, FILM COATED ORAL at 20:59

## 2017-11-01 RX ADMIN — KETOROLAC TROMETHAMINE 30 MG: 30 INJECTION, SOLUTION INTRAMUSCULAR at 06:10

## 2017-11-01 RX ADMIN — Medication 10 ML: at 18:07

## 2017-11-01 RX ADMIN — MORPHINE SULFATE 2 MG: 2 INJECTION, SOLUTION INTRAMUSCULAR; INTRAVENOUS at 04:34

## 2017-11-01 RX ADMIN — CLOPIDOGREL BISULFATE 150 MG: 75 TABLET ORAL at 09:02

## 2017-11-01 RX ADMIN — METOPROLOL TARTRATE 12.5 MG: 25 TABLET, FILM COATED ORAL at 20:59

## 2017-11-01 RX ADMIN — PANTOPRAZOLE SODIUM 40 MG: 40 TABLET, DELAYED RELEASE ORAL at 06:10

## 2017-11-01 RX ADMIN — HYDROCODONE BITARTRATE AND ACETAMINOPHEN 1 TABLET: 10; 325 TABLET ORAL at 00:35

## 2017-11-01 RX ADMIN — HYDROCODONE BITARTRATE AND ACETAMINOPHEN 1 TABLET: 5; 325 TABLET ORAL at 18:00

## 2017-11-01 RX ADMIN — ASPIRIN 325 MG: 325 TABLET, DELAYED RELEASE ORAL at 09:03

## 2017-11-01 RX ADMIN — Medication 2 TABLET: at 09:03

## 2017-11-01 RX ADMIN — HEPARIN SODIUM 5000 UNITS: 5000 INJECTION, SOLUTION INTRAVENOUS; SUBCUTANEOUS at 21:00

## 2017-11-01 RX ADMIN — HYDROCODONE BITARTRATE AND ACETAMINOPHEN 1 TABLET: 10; 325 TABLET ORAL at 04:34

## 2017-11-01 RX ADMIN — FUROSEMIDE 40 MG: 10 INJECTION, SOLUTION INTRAMUSCULAR; INTRAVENOUS at 09:03

## 2017-11-01 RX ADMIN — TETRAHYDROZOLINE HYDROCHLORIDE 1 DROP: 0.5 SOLUTION/ DROPS OPHTHALMIC at 19:16

## 2017-11-01 RX ADMIN — KETOROLAC TROMETHAMINE 30 MG: 30 INJECTION, SOLUTION INTRAMUSCULAR at 13:43

## 2017-11-01 RX ADMIN — CEFUROXIME 1.5 G: 1.5 INJECTION, POWDER, FOR SOLUTION INTRAVENOUS at 06:09

## 2017-11-01 RX ADMIN — KETOROLAC TROMETHAMINE 30 MG: 30 INJECTION, SOLUTION INTRAMUSCULAR at 21:00

## 2017-11-01 RX ADMIN — Medication 2 TABLET: at 18:06

## 2017-11-01 RX ADMIN — HYDROCODONE BITARTRATE AND ACETAMINOPHEN 1 TABLET: 10; 325 TABLET ORAL at 08:03

## 2017-11-01 NOTE — PLAN OF CARE
Problem: Patient Care Overview (Adult)  Goal: Plan of Care Review  Outcome: Ongoing (interventions implemented as appropriate)    11/01/17 1126   Coping/Psychosocial Response Interventions   Plan Of Care Reviewed With patient   Outcome Evaluation   Outcome Summary/Follow up Plan Pt increased ambulation distance to 200ft with CGA. Pt given VC's for slower gait speed with increased step length. Continue to progress as appropriate.    Patient Care Overview   Progress improving         Problem: Inpatient Physical Therapy  Goal: Bed Mobility Goal LTG- PT  Outcome: Ongoing (interventions implemented as appropriate)    10/31/17 0946 11/01/17 1126   Bed Mobility PT LTG   Bed Mobility PT LTG, Date Established 10/31/17 --    Bed Mobility PT LTG, Time to Achieve 2 wks --    Bed Mobility PT LTG, Activity Type supine to sit/sit to supine --    Bed Mobility PT LTG, [place] Level independent --    Bed Mobility PT LTG, Outcome --  goal ongoing       Goal: Transfer Training Goal 1 LTG- PT  Outcome: Ongoing (interventions implemented as appropriate)    10/31/17 0946 11/01/17 1126   Transfer Training PT LTG   Transfer Training PT LTG, Date Established 10/31/17 --    Transfer Training PT LTG, Time to Achieve 2 wks --    Transfer Training PT LTG, Activity Type sit to stand/stand to sit --    Transfer Training PT LTG, [place] Level independent --    Transfer Training PT LTG, Outcome --  goal ongoing       Goal: Gait Training Goal LTG- PT  Outcome: Ongoing (interventions implemented as appropriate)    10/31/17 0946 11/01/17 1126   Gait Training PT LTG   Gait Training Goal PT LTG, Date Established 10/31/17 --    Gait Training Goal PT LTG, Time to Achieve 2 wks --    Gait Training Goal PT LTG, [place] Level independent --    Gait Training Goal PT LTG, Distance to Achieve 400 --    Gait Training Goal PT LTG, Outcome --  goal ongoing

## 2017-11-01 NOTE — PLAN OF CARE
Problem: Patient Care Overview (Adult)  Goal: Plan of Care Review  Outcome: Ongoing (interventions implemented as appropriate)    11/01/17 0318   Coping/Psychosocial Response Interventions   Plan Of Care Reviewed With spouse;patient   Outcome Evaluation   Outcome Summary/Follow up Plan Pt doing well. Pain controled with Lortab and Toradol.    Patient Care Overview   Progress improving       Goal: Adult Individualization and Mutuality  Outcome: Ongoing (interventions implemented as appropriate)    11/01/17 0318   Individualization   Patient Specific Goals Pain control         Problem: Cardiac Surgery (Adult)  Goal: Signs and Symptoms of Listed Potential Problems Will be Absent or Manageable (Cardiac Surgery)  Outcome: Ongoing (interventions implemented as appropriate)    11/01/17 0318   Cardiac Surgery   Problems Assessed (Cardiac Surgery) all   Problems Present (Cardiac Surgery) pain         Problem: Pressure Ulcer Risk (Rolan Scale) (Adult,Obstetrics,Pediatric)  Goal: Skin Integrity  Outcome: Ongoing (interventions implemented as appropriate)    11/01/17 0318   Pressure Ulcer Risk (Rolan Scale) (Adult,Obstetrics,Pediatric)   Skin Integrity making progress toward outcome

## 2017-11-01 NOTE — PROGRESS NOTES
Pt. Referred for Phase II Cardiac Rehab. Staff discussed benefits of exercise, program protocol, and educational material provided. Teach back verified.  Patient states that he will discuss the program with his wife and will call [facility] when he is ready to schedule an appointment.

## 2017-11-01 NOTE — THERAPY TREATMENT NOTE
Acute Care - Physical Therapy Treatment Note  [facility]     Patient Name: Clarence Hylton  : 1970  MRN: [id number]  Today's Date: 2017  Onset of Illness/Injury or Date of Surgery Date: 10/30/17  Date of Referral to PT: 10/31/17  Referring Physician: MD Starr    Admit Date: 10/30/2017    Visit Dx:    ICD-10-CM ICD-9-CM   1. Impaired functional mobility, balance, gait, and endurance Z74.09 V49.89   2. CAD in native artery I25.10 414.01     Patient Active Problem List   Diagnosis   • Multivessel CAD with unstable angina. LV function preserved   • S/P CABG x 4; 10/30/17   • GERD (gastroesophageal reflux disease)   • Psoriasis               Adult Rehabilitation Note       17 1000          Rehab Assessment/Intervention    Discipline physical therapist  -KR      Document Type therapy note (daily note)  -KR      Subjective Information agree to therapy;no complaints  -KR      Patient Effort, Rehab Treatment good  -KR      Symptoms Noted During/After Treatment none  -KR      Precautions/Limitations cardiac precautions;oxygen therapy device and L/min;sternal precautions  -KR      Recorded by [KR] Cecile Snell, PT      Vital Signs    Pre Systolic BP Rehab 110  -KR      Pre Treatment Diastolic BP 66  -KR      Post Systolic BP Rehab 120  -KR      Post Treatment Diastolic BP 67  -KR      Pretreatment Heart Rate (beats/min) 93  -KR      Posttreatment Heart Rate (beats/min) 91  -KR      Pre SpO2 (%) 90  -KR      O2 Delivery Pre Treatment supplemental O2  -KR      Post SpO2 (%) 96  -KR      O2 Delivery Post Treatment supplemental O2  -KR      Pre Patient Position Sitting  -KR      Intra Patient Position Standing  -KR      Post Patient Position Supine  -KR      Recorded by [KR] Cecile Snell, PT      Pain Assessment    Pain Assessment 0-10  -KR      Pain Score 0  -KR      Post Pain Score 0  -KR      Recorded by [KR] Cecile Snell, PT      Cognitive Assessment/Intervention    Current  Cognitive/Communication Assessment functional  -KR      Orientation Status oriented x 4  -KR      Follows Commands/Answers Questions able to follow single-step instructions;100% of the time  -KR      Personal Safety WNL/WFL  -KR      Personal Safety Interventions fall prevention program maintained;gait belt;nonskid shoes/slippers when out of bed  -KR      Recorded by [KR] Cecile Snell, PT      Bed Mobility, Assessment/Treatment    Bed Mob, Supine to Sit, [place] not tested  -KR      Bed Mob, Sit to Supine, [place] minimum assist (75% patient effort);2 person assist required;verbal cues required  -KR      Bed Mobility, Safety Issues decreased use of arms for pushing/pulling  -KR      Bed Mobility, Comment VC's for sequencing.   -KR      Recorded by [KR] Cecile Snell, PT      Transfer Assessment/Treatment    Transfers, Sit-Stand [place] minimum assist (75% patient effort);2 person assist required;verbal cues required  -KR      Transfers, Stand-Sit [place] minimum assist (75% patient effort);2 person assist required;verbal cues required  -KR      Transfer, Safety Issues step length decreased;weight-shifting ability decreased  -KR      Transfer, Comment VC's to maintain sternal precautions.  -KR      Recorded by [KR] Cecile Snell, PT      Gait Assessment/Treatment    Gait, [place] Level contact guard assist;1 person + 1 person to manage equipment;verbal cues required  -KR      Gait, Distance (Feet) 200  -KR      Gait, Gait Pattern Analysis swing-to gait  -KR      Gait, Gait Deviations step length decreased;weight-shifting ability decreased  -KR      Gait, Safety Issues step length decreased;balance decreased during turns;supplemental O2  -KR      Gait, Impairments impaired balance  -KR      Gait, Comment VC's for slower, more controlled gait speed  -KR      Recorded by [KR] Cecile Snell, PT      Motor Skills/Interventions    Additional Documentation Balance Skills Training (Group)  -KR       Recorded by [KR] Cecile Snell, PT      Balance Skills Training    Sitting-Level of Assistance Distant supervision  -KR      Sitting-Balance Support Right upper extremity supported;Left upper extremity supported;Feet supported  -KR      Standing-Level of Assistance Contact guard  -KR      Static Standing Balance Support No upper extremity supported  -KR      Gait Balance-Level of Assistance Contact guard  -KR      Gait Balance Support No upper extremity supported  -KR      Recorded by [KR] Cecile Snell, PT      Positioning and Restraints    Pre-Treatment Position sitting in chair/recliner  -KR      Post Treatment Position bed  -KR      In Bed notified nsg;supine;call light within reach;encouraged to call for assist;with family/caregiver;side rails up x2;RUE elevated;LUE elevated  -KR      Recorded by [KR] Cecile Snell, PT        User Key  (r) = Recorded By, (t) = Taken By, (c) = Cosigned By    Initials Name Effective Dates    DORON Snell, PT 09/25/17 -                 IP PT Goals       11/01/17 1126 10/31/17 0946       Bed Mobility PT LTG    Bed Mobility PT LTG, Date Established  10/31/17  -THUY     Bed Mobility PT LTG, Time to Achieve  2 wks  -THUY     Bed Mobility PT LTG, Activity Type  supine to sit/sit to supine  -THUY     Bed Mobility PT LTG, [place] Level  independent  -THUY     Bed Mobility PT LTG, Outcome goal ongoing  -KR goal ongoing  -THUY     Transfer Training PT LTG    Transfer Training PT LTG, Date Established  10/31/17  -THUY     Transfer Training PT LTG, Time to Achieve  2 wks  -THUY     Transfer Training PT LTG, Activity Type  sit to stand/stand to sit  -THUY     Transfer Training PT LTG, [place] Level  independent  -THUY     Transfer Training PT LTG, Outcome goal ongoing  -KR goal ongoing  -THUY     Gait Training PT LTG    Gait Training Goal PT LTG, Date Established  10/31/17  -THUY     Gait Training Goal PT LTG, Time to Achieve  2 wks  -THUY     Gait Training Goal PT LTG, [place] Level   independent  -THUY     Gait Training Goal PT LTG, Distance to Achieve  400  -THUY     Gait Training Goal PT LTG, Outcome goal ongoing  -KR goal ongoing  -THUY       User Key  (r) = Recorded By, (t) = Taken By, (c) = Cosigned By    Initials Name Provider Type    THUY Aruna Jones, PT Physical Therapist    DORON Snell, PT Physical Therapist          Physical Therapy Education     Title: PT OT SLP Therapies (Active)     Topic: Physical Therapy (Active)     Point: Mobility training (Active)    Learning Progress Summary    Learner Readiness Method Response Comment Documented by Status   Patient Acceptance E NR  KR 11/01/17 1125 Active    Acceptance E NR  THUY 10/31/17 0946 Active               Point: Home exercise program (Active)    Learning Progress Summary    Learner Readiness Method Response Comment Documented by Status   Patient Acceptance E NR  THUY 10/31/17 0946 Active               Point: Body mechanics (Active)    Learning Progress Summary    Learner Readiness Method Response Comment Documented by Status   Patient Acceptance E NR  KR 11/01/17 1125 Active    Acceptance E NR  THUY 10/31/17 0946 Active               Point: Precautions (Active)    Learning Progress Summary    Learner Readiness Method Response Comment Documented by Status   Patient Acceptance E NR  KR 11/01/17 1125 Active    Acceptance E NR  THUY 10/31/17 0946 Active                      User Key     Initials Effective Dates Name Provider Type Discipline    THUY 06/19/15 -  Aruna Jones, PT Physical Therapist PT    KR 09/25/17 -  Cecile Snell PT Physical Therapist PT                    PT Recommendation and Plan  Anticipated Discharge Disposition: home with assist  PT Frequency: daily, per priority policy  Plan of Care Review  Plan Of Care Reviewed With: patient  Progress: improving  Outcome Summary/Follow up Plan: Pt increased ambulation distance to 200ft with CGA. Pt given VC's for slower gait speed with increased step length. Continue to progress  as appropriate.           Outcome Measures       11/01/17 1000 10/31/17 0820       How much help from another person do you currently need...    Turning from your back to your side while in flat bed without using bedrails? 3  -KR 3  -THUY     Moving from lying on back to sitting on the side of a flat bed without bedrails? 3  -KR 3  -THUY     Moving to and from a bed to a chair (including a wheelchair)? 3  -KR 3  -THUY     Standing up from a chair using your arms (e.g., wheelchair, bedside chair)? 3  -KR 3  -THUY     Climbing 3-5 steps with a railing? 3  -KR 3  -THUY     To walk in hospital room? 3  -KR 3  -THUY     AM-PAC 6 Clicks Score 18  -KR 18  -THUY     Functional Assessment    Outcome Measure Options AM-PAC 6 Clicks Basic Mobility (PT)  -KR AM-PAC 6 Clicks Basic Mobility (PT)  -THUY       User Key  (r) = Recorded By, (t) = Taken By, (c) = Cosigned By    Initials Name Provider Type    THUY Jones, PT Physical Therapist    DORON Snell PT Physical Therapist           Time Calculation:         PT Charges       11/01/17 1127          Time Calculation    Start Time 1000  -KR      PT Received On 11/01/17  -KR      PT Goal Re-Cert Due Date 11/10/17  -KR      Time Calculation- PT    Total Timed Code Minutes- PT 23 minute(s)  -KR        User Key  (r) = Recorded By, (t) = Taken By, (c) = Cosigned By    Initials Name Provider Type    DORON Snell PT Physical Therapist          Therapy Charges for Today     Code Description Service Date Service Provider Modifiers Qty    97375069996 HC PT THER PROC EA 15 MIN 11/1/2017 Cecile Snell, PT GP 2    03575058132 HC PT THER SUPP EA 15 MIN 11/1/2017 Cecile Snell, PT GP 2          PT G-Codes  Outcome Measure Options: AM-PAC 6 Clicks Basic Mobility (PT)    Julia Snell PT  11/1/2017

## 2017-11-01 NOTE — PROGRESS NOTES
CTS Progress Note       LOS: 2 days   Patient Care Team:  No Known Provider as PCP - General  Ez Vines MD as Consulting Physician (Cardiology)  Clarence Waldron MD as Consulting Physician (Cardiology)    No chief complaint on file.      Vital Signs:  Temp:  [98 °F (36.7 °C)-99.2 °F (37.3 °C)] 98.4 °F (36.9 °C)  Heart Rate:  [79-89] 80  Resp:  [14-22] 16  BP: ()/(57-75) 98/62    Physical Exam:  Alert and conversant.  Breathing unlabored no neurologic deficits minimal bleeding     Results:     Results from last 7 days  Lab Units 11/01/17  0412   WBC 10*3/mm3 9.79   HEMOGLOBIN g/dL 11.0*   HEMATOCRIT % 33.7*   PLATELETS 10*3/mm3 171       Results from last 7 days  Lab Units 11/01/17  0412   SODIUM mmol/L 133   POTASSIUM mmol/L 3.9   CHLORIDE mmol/L 102   CO2 mmol/L 26.0   BUN mg/dL 8*   CREATININE mg/dL 0.60   GLUCOSE mg/dL 114*   CALCIUM mg/dL 8.8           Imaging Results (last 24 hours)     Procedure Component Value Units Date/Time    XR Chest 1 View [[id number]] Collected:  10/31/17 1025     Updated:  10/31/17 1137    Narrative:       EXAMINATION: XR CHEST 1 VW-10/31/2017:      INDICATION: Postop Heart Surgery; I25.10-Atherosclerotic heart disease  of native coronary artery without angina pectoris.      COMPARISON: 10/30/2017.     FINDINGS: The heart is large. There is bibasilar airspace disease. A  left chest tube is in position. There is no pneumothorax. A [employer]  catheter is noted.           Impression:       Bibasilar airspace disease. There are no acute findings and  there has been no change since 10/30/2017.     D:  10/31/2017  E:  10/31/2017     This report was finalized on 10/31/2017 11:35 AM by Dr. Jorge Moon MD.       XR Chest 1 View [[id number]] Updated:  11/01/17 0430          Assessment    Principal Problem:    Multivessel CAD with unstable angina. LV function preserved  Active Problems:    S/P CABG x 4; 10/30/17    GERD (gastroesophageal reflux disease)    Psoriasis        Plan    Lasix 40 mg IV ×1.  Plavix 150 mg by mouth today and then 75 mg by mouth daily thereafter.  Transfer to telemetryAnd discontinue chest tubes and pacing wires.    Please note that portions of this note were completed with a voice recognition program. Efforts were made to edit the dictations, but occasionally words are mistranscribed.    Reginaldo Starr MD  11/01/17  6:20 AM

## 2017-11-01 NOTE — PROGRESS NOTES
"Intensivist Note     11/1/2017  Hospital Day: 2      Mr. Clarence Hylton, 47 y.o. male is followed for:  Principal Problem:    Multivessel CAD with unstable angina. LV function preserved  Active Problems:    S/P CABG x 4; 10/30/17    GERD (gastroesophageal reflux disease)    Psoriasis       SUBJECTIVE     Subjective    47-year-old white male nonsmoker who presented with dyspnea and unstable angina. Underwent cardiac catheterization revealing significant three-vessel coronary artery disease but preserved LV function. He has now is postop day #2 CABG ×4 and is doing  well. Pain level has improved.  he remains off all pressors. Blood sugars are well-controlled off all insulin (he is a nondiabetic). No dyspnea, pleuritic chest pain, abdominal pain, nausea, vomiting, diarrhea.    The patient's relevant past medical, surgical and social history were reviewed and updated in Epic as appropriate.    OBJECTIVE     /65 (BP Location: Right arm, Patient Position: Lying)  Pulse 83  Temp 99 °F (37.2 °C) (Axillary)   Resp 18  Ht 76\" (193 cm)  Wt 260 lb (118 kg)  SpO2 95%  BMI 31.65 kg/m2  Flow (L/min): 2    Flowsheet Rows         First Filed Value    Admission Height  76\" (193 cm) Documented at 10/30/2017 0857    Admission Weight  260 lb (118 kg) Documented at 10/30/2017 0857        Intake & Output (last day)       10/31 0701 - 11/01 0700 11/01 0701 - 11/02 0700    P.O.  240    I.V. (mL/kg) 32 (0.3)     Blood      Other      IV Piggyback 460     Total Intake(mL/kg) 492 (4.2) 240 (2)    Urine (mL/kg/hr) 1385 (0.5) 975 (0.8)    Other 350 (0.1)     Total Output 1735 975    Net -7773 -693                Objective    Exam:  General Exam:  Well-developed male in good spirits. No acute distress.  HEENT: Pupils equal and reactive. Nose and throat clear.Face flushed  Neck:                          Supple, no JVD, thyromegaly, or adenopathy  Lungs: Clear anteriorly and laterally  Chest:                          MT/CT tubes and " wires just removed  Cardiovascular: Regular rate and rhythm without murmurs or gallops.  Abdomen: Soft nontender without organomegaly or masses.   and rectal: Deferred.  Extremities: No cyanosis clubbing edema.  Neurologic:                 Symmetric strength. No focal deficits.    Chest X-Ray: Cardiomegaly but no evidence of congestive failure. MT/CT tubes in position. Elevated right hemidiaphragm. Gastric distention has resolved      Results from last 7 days  Lab Units 11/01/17  0412 10/31/17  0424 10/31/17  0020 10/30/17  1742   WBC 10*3/mm3 9.79 12.86*  --  11.98*   HEMOGLOBIN g/dL 11.0* 11.7* 11.5* 11.7*   HEMATOCRIT % 33.7* 36.4* 35.2* 35.5*   PLATELETS 10*3/mm3 171 262  --  210       Results from last 7 days  Lab Units 11/01/17  0412 10/31/17  0530   SODIUM mmol/L 133 133   POTASSIUM mmol/L 3.9 4.2   CHLORIDE mmol/L 102 102   CO2 mmol/L 26.0 24.0   BUN mg/dL 8* 9   CREATININE mg/dL 0.60 0.70   GLUCOSE mg/dL 114* 151*   CALCIUM mg/dL 8.8 8.3*       Results from last 7 days  Lab Units 10/31/17  0530 10/30/17  1742 10/30/17  1420   MAGNESIUM mg/dL 2.0 2.6 3.5*   PHOSPHORUS mg/dL 2.9 5.2* 4.4       No results found for: SEDRATE  No results found for: BNP  No results found for: CKTOTAL, CKMB, CKMBINDEX, TROPONINI, TROPONINT  No results found for: TSH  No results found for: LACTATE  No results found for: CORTISOL      Results from last 7 days  Lab Units 10/30/17  1747 10/30/17  1719 10/30/17  1434 10/30/17  1314 10/30/17  1230   PH, ARTERIAL pH units 7.349* 7.350 7.302* 7.32* 7.26*   PCO2, ARTERIAL mm Hg 47.0 45.0 51.1*  --   --    PO2 ART mm Hg 103.0 91.0 214.0*  --   --    HCO3 ART mmol/L 25.8 25.1 25.2  --   --    FIO2 % 40  --  100  --   --        I reviewed the patient's results, images and medication.    Assessment/Plan   ASSESSMENT      Principal Problem:    Multivessel CAD with unstable angina. LV function preserved  Active Problems:    S/P CABG x 4; 10/30/17    GERD (gastroesophageal reflux disease)     Psoriasis      DISCUSSION: Looks great and ready for transfer to the floor    PLAN     1. Transfer to the floor  2. Start low-dose heparin for DVT prophylaxis tonight    Plan of care and goals reviewed with mulitdisciplinary team at daily rounds.    I discussed the patient's findings and my recommendations with patient, family and nursing staff    Time spent Critical care 20 min (It does not include procedure time).    Maximino Jacome MD  Intensive Care Medicine  11/01/17 4:45 PM

## 2017-11-01 NOTE — PROGRESS NOTES
Continued Stay Note   [facility]     Patient Name: Clarence Hylton  MRN: [id number]  Today's Date: 11/1/2017    Admit Date: 10/30/2017          Discharge Plan       11/01/17 1007    Case Management/Social Work Plan    Plan Home    Patient/Family In Agreement With Plan --   Patient/Spouse    Additional Comments Doing well, planning transfer to floor bed today.  Met with patient/spouse @ bedside, no needs/concerns voiced at this time.  Case Management available as needed.  Radha Montelongo, Ext. [phone]               Discharge Codes     None            NAYELI Pitts

## 2017-11-01 NOTE — PROGRESS NOTES
Multidisciplinary Rounds    Time: 20min  Patient Name: Clarence Hylton  Date of Encounter: 11/01/17 11:18 AM  MRN: [id number]  Admission date: 10/30/2017       DELFINA FERREIRA to continue to follow per protocol.     Current diet: Diet Regular; Consistent Carbohydrate, Cardiac      Intervention:  Follow treatment plan  Care plan reviewed    Follow up:   Per protocol      Louann Samuels RD  11:18 AM

## 2017-11-02 VITALS
RESPIRATION RATE: 18 BRPM | DIASTOLIC BLOOD PRESSURE: 73 MMHG | SYSTOLIC BLOOD PRESSURE: 132 MMHG | HEIGHT: 76 IN | OXYGEN SATURATION: 94 % | WEIGHT: 261.4 LBS | BODY MASS INDEX: 31.83 KG/M2 | HEART RATE: 96 BPM | TEMPERATURE: 98.3 F

## 2017-11-02 LAB
ACT BLD: 109 SECONDS (ref 82–152)
ANION GAP SERPL CALCULATED.3IONS-SCNC: 8 MMOL/L (ref 3–11)
BUN BLD-MCNC: 12 MG/DL (ref 9–23)
BUN/CREAT SERPL: 15 (ref 7–25)
CALCIUM SPEC-SCNC: 9 MG/DL (ref 8.7–10.4)
CHLORIDE SERPL-SCNC: 101 MMOL/L (ref 99–109)
CO2 SERPL-SCNC: 29 MMOL/L (ref 20–31)
CREAT BLD-MCNC: 0.8 MG/DL (ref 0.6–1.3)
DEPRECATED RDW RBC AUTO: 45.2 FL (ref 37–54)
ERYTHROCYTE [DISTWIDTH] IN BLOOD BY AUTOMATED COUNT: 14.1 % (ref 11.3–14.5)
GFR SERPL CREATININE-BSD FRML MDRD: 104 ML/MIN/1.73
GLUCOSE BLD-MCNC: 106 MG/DL (ref 70–100)
HCT VFR BLD AUTO: 35 % (ref 38.9–50.9)
HGB BLD-MCNC: 11.5 G/DL (ref 13.1–17.5)
MCH RBC QN AUTO: 28.8 PG (ref 27–31)
MCHC RBC AUTO-ENTMCNC: 32.9 G/DL (ref 32–36)
MCV RBC AUTO: 87.5 FL (ref 80–99)
PLATELET # BLD AUTO: 194 10*3/MM3 (ref 150–450)
PMV BLD AUTO: 10.5 FL (ref 6–12)
POTASSIUM BLD-SCNC: 4.3 MMOL/L (ref 3.5–5.5)
RBC # BLD AUTO: 4 10*6/MM3 (ref 4.2–5.76)
SODIUM BLD-SCNC: 138 MMOL/L (ref 132–146)
WBC NRBC COR # BLD: 8.79 10*3/MM3 (ref 3.5–10.8)

## 2017-11-02 PROCEDURE — 85027 COMPLETE CBC AUTOMATED: CPT | Performed by: PHYSICIAN ASSISTANT

## 2017-11-02 PROCEDURE — 99221 1ST HOSP IP/OBS SF/LOW 40: CPT | Performed by: NURSE PRACTITIONER

## 2017-11-02 PROCEDURE — 80048 BASIC METABOLIC PNL TOTAL CA: CPT | Performed by: PHYSICIAN ASSISTANT

## 2017-11-02 PROCEDURE — 25010000002 KETOROLAC TROMETHAMINE PER 15 MG: Performed by: INTERNAL MEDICINE

## 2017-11-02 PROCEDURE — 25010000002 HEPARIN (PORCINE) PER 1000 UNITS: Performed by: INTERNAL MEDICINE

## 2017-11-02 PROCEDURE — 25010000002 FUROSEMIDE PER 20 MG: Performed by: PHYSICIAN ASSISTANT

## 2017-11-02 PROCEDURE — 99024 POSTOP FOLLOW-UP VISIT: CPT | Performed by: THORACIC SURGERY (CARDIOTHORACIC VASCULAR SURGERY)

## 2017-11-02 RX ORDER — FUROSEMIDE 10 MG/ML
40 INJECTION INTRAMUSCULAR; INTRAVENOUS ONCE
Status: COMPLETED | OUTPATIENT
Start: 2017-11-02 | End: 2017-11-02

## 2017-11-02 RX ORDER — ATORVASTATIN CALCIUM 40 MG/1
40 TABLET, FILM COATED ORAL NIGHTLY
Qty: 30 TABLET | Refills: 11 | Status: SHIPPED | OUTPATIENT
Start: 2017-11-02

## 2017-11-02 RX ORDER — HYDROCODONE BITARTRATE AND ACETAMINOPHEN 5; 325 MG/1; MG/1
1 TABLET ORAL EVERY 6 HOURS PRN
Qty: 15 TABLET | Refills: 0 | Status: SHIPPED | OUTPATIENT
Start: 2017-11-02 | End: 2017-11-11

## 2017-11-02 RX ORDER — CLOPIDOGREL BISULFATE 75 MG/1
75 TABLET ORAL DAILY
Qty: 30 TABLET | Refills: 3 | Status: SHIPPED | OUTPATIENT
Start: 2017-11-02

## 2017-11-02 RX ADMIN — ASPIRIN 325 MG: 325 TABLET, DELAYED RELEASE ORAL at 09:16

## 2017-11-02 RX ADMIN — HYDROCODONE BITARTRATE AND ACETAMINOPHEN 1 TABLET: 5; 325 TABLET ORAL at 09:22

## 2017-11-02 RX ADMIN — KETOROLAC TROMETHAMINE 30 MG: 30 INJECTION, SOLUTION INTRAMUSCULAR at 14:08

## 2017-11-02 RX ADMIN — HEPARIN SODIUM 5000 UNITS: 5000 INJECTION, SOLUTION INTRAVENOUS; SUBCUTANEOUS at 14:08

## 2017-11-02 RX ADMIN — CLOPIDOGREL BISULFATE 75 MG: 75 TABLET ORAL at 09:16

## 2017-11-02 RX ADMIN — KETOROLAC TROMETHAMINE 30 MG: 30 INJECTION, SOLUTION INTRAMUSCULAR at 06:01

## 2017-11-02 RX ADMIN — HEPARIN SODIUM 5000 UNITS: 5000 INJECTION, SOLUTION INTRAVENOUS; SUBCUTANEOUS at 06:01

## 2017-11-02 RX ADMIN — FUROSEMIDE 40 MG: 10 INJECTION, SOLUTION INTRAMUSCULAR; INTRAVENOUS at 09:23

## 2017-11-02 RX ADMIN — Medication 2 TABLET: at 09:16

## 2017-11-02 RX ADMIN — METOPROLOL TARTRATE 12.5 MG: 25 TABLET, FILM COATED ORAL at 09:16

## 2017-11-02 RX ADMIN — PANTOPRAZOLE SODIUM 40 MG: 40 TABLET, DELAYED RELEASE ORAL at 06:01

## 2017-11-02 RX ADMIN — HYDROCODONE BITARTRATE AND ACETAMINOPHEN 1 TABLET: 5; 325 TABLET ORAL at 04:09

## 2017-11-02 NOTE — PROGRESS NOTES
[facility]       LOS: 3 days   Patient Care Team:  No Known Provider as PCP - General  Ez Vines MD as Consulting Physician (Cardiology)  Clarence Waldron MD as Consulting Physician (Cardiology)        Subjective       Patient Denies:  Sob, palpitations.  Feeling good      Vital Signs  Temp:  [99 °F (37.2 °C)] 99 °F (37.2 °C)  Heart Rate:  [] 97  Resp:  [16-20] 20  BP: (105-132)/(65-74) 115/68    Intake/Output Summary (Last 24 hours) at 11/02/17 1114  Last data filed at 11/02/17 0409   Gross per 24 hour   Intake                0 ml   Output              825 ml   Net             -825 ml          Physical Exam:     General Appearance:    Alert, cooperative, in no acute distress       Neck:   No adenopathy, supple, trachea midline, no thyromegaly, no JVD       Lungs:     Clear to auscultation anteriorly,respirations regular, even and                  unlabored    Heart:    Regular rhythm and normal rate, normal S1 and S2, no            murmur, no gallop, no rub, no click   Chest Wall:    No abnormalities observed   Abdomen:     Normal bowel sounds, no masses, no organomegaly, soft        non-tender, non-distended       Extremities:   Moves all extremities well, no edema, no cyanosis, no             redness   Pulses:   Pulses palpable and equal bilaterally     Results Review:     I reviewed the patient's new clinical results.      WBC WBC   Date/Time Value Ref Range Status   11/02/2017 0459 8.79 3.50 - 10.80 10*3/mm3 Final   11/01/2017 0412 9.79 3.50 - 10.80 10*3/mm3 Final   10/31/2017 0424 12.86 (H) 3.50 - 10.80 10*3/mm3 Final   10/30/2017 1742 11.98 (H) 3.50 - 10.80 10*3/mm3 Final   10/30/2017 1420 12.68 (H) 3.50 - 10.80 10*3/mm3 Final            HGB Hemoglobin   Date/Time Value Ref Range Status   11/02/2017 0459 11.5 (L) 13.1 - 17.5 g/dL Final   11/01/2017 0412 11.0 (L) 13.1 - 17.5 g/dL Final   10/31/2017 0424 11.7 (L) 13.1 - 17.5 g/dL  Final   10/31/2017 0020 11.5 (L) 13.1 - 17.5 g/dL Final   10/30/2017 1742 11.7 (L) 13.1 - 17.5 g/dL Final   10/30/2017 1420 12.0 (L) 13.1 - 17.5 g/dL Final   10/30/2017 1314 11.6 (L) 12.0 - 17.0 g/dL Final   10/30/2017 1230 11.6 (L) 12.0 - 17.0 g/dL Final   10/30/2017 1157 11.9 (L) 12.0 - 17.0 g/dL Final   10/30/2017 1137 11.6 (L) 12.0 - 17.0 g/dL Final   10/30/2017 1120 13.9 12.0 - 17.0 g/dL Final           HCT Hematocrit   Date/Time Value Ref Range Status   11/02/2017 0459 35.0 (L) 38.9 - 50.9 % Final   11/01/2017 0412 33.7 (L) 38.9 - 50.9 % Final   10/31/2017 0424 36.4 (L) 38.9 - 50.9 % Final   10/31/2017 0020 35.2 (L) 38.9 - 50.9 % Final   10/30/2017 1742 35.5 (L) 38.9 - 50.9 % Final   10/30/2017 1420 36.8 (L) 38.9 - 50.9 % Final   10/30/2017 1314 34 (L) 38 - 51 % Final   10/30/2017 1230 34 (L) 38 - 51 % Final   10/30/2017 1157 35 (L) 38 - 51 % Final   10/30/2017 1137 34 (L) 38 - 51 % Final   10/30/2017 1120 41 38 - 51 % Final            Platlets No results found for: LABPLAT  Sodium  Sodium   Date/Time Value Ref Range Status   11/02/2017 0459 138 132 - 146 mmol/L Final   11/01/2017 0412 133 132 - 146 mmol/L Final   10/31/2017 0530 133 132 - 146 mmol/L Final   10/30/2017 1742 134 132 - 146 mmol/L Final   10/30/2017 1420 138 132 - 146 mmol/L Final     Potassium  Potassium   Date/Time Value Ref Range Status   11/02/2017 0459 4.3 3.5 - 5.5 mmol/L Final   11/01/2017 0412 3.9 3.5 - 5.5 mmol/L Final   10/31/2017 0530 4.2 3.5 - 5.5 mmol/L Final   10/30/2017 1742 4.0 3.5 - 5.5 mmol/L Final   10/30/2017 1420 4.3 3.5 - 5.5 mmol/L Final     Chloride  Chloride   Date/Time Value Ref Range Status   11/02/2017 0459 101 99 - 109 mmol/L Final   11/01/2017 0412 102 99 - 109 mmol/L Final   10/31/2017 0530 102 99 - 109 mmol/L Final   10/30/2017 1742 102 99 - 109 mmol/L Final   10/30/2017 1420 109 99 - 109 mmol/L Final     BicarbonateNo results found for: PLASMABICARB    BUN BUN   Date/Time Value Ref Range Status   11/02/2017 0459  12 9 - 23 mg/dL Final   11/01/2017 0412 8 (L) 9 - 23 mg/dL Final   10/31/2017 0530 9 9 - 23 mg/dL Final   10/30/2017 1742 12 9 - 23 mg/dL Final   10/30/2017 1420 12 9 - 23 mg/dL Final      Creatinine Creatinine   Date/Time Value Ref Range Status   11/02/2017 0459 0.80 0.60 - 1.30 mg/dL Final   11/01/2017 0412 0.60 0.60 - 1.30 mg/dL Final   10/31/2017 0530 0.70 0.60 - 1.30 mg/dL Final   10/30/2017 1742 0.90 0.60 - 1.30 mg/dL Final   10/30/2017 1420 0.80 0.60 - 1.30 mg/dL Final      Calcium Calcium   Date/Time Value Ref Range Status   11/02/2017 0459 9.0 8.7 - 10.4 mg/dL Final   11/01/2017 0412 8.8 8.7 - 10.4 mg/dL Final   10/31/2017 0530 8.3 (L) 8.7 - 10.4 mg/dL Final   10/30/2017 1742 8.3 (L) 8.7 - 10.4 mg/dL Final   10/30/2017 1420 9.3 8.7 - 10.4 mg/dL Final      Mag Magnesium   Date/Time Value Ref Range Status   10/31/2017 0530 2.0 1.3 - 2.7 mg/dL Final   10/30/2017 1742 2.6 1.3 - 2.7 mg/dL Final   10/30/2017 1420 3.5 (H) 1.3 - 2.7 mg/dL Final           PT/INR:    Protime   Date Value Ref Range Status   10/31/2017 11.0 9.6 - 11.5 Seconds Final   10/30/2017 11.7 (H) 9.6 - 11.5 Seconds Final   10/30/2017 11.6 (H) 9.6 - 11.5 Seconds Final   /  INR   Date Value Ref Range Status   10/31/2017 1.01  Final   10/30/2017 1.07  Final   10/30/2017 1.06  Final     Troponin I   No results found for: CKTOTAL, CKMB, CKMBINDEX, TROPONINI, TROPONINT      aspirin 325 mg Oral Daily   atorvastatin 40 mg Oral Nightly   clopidogrel 75 mg Oral Daily   heparin (porcine) 5,000 Units Subcutaneous Q8H   ketorolac 30 mg Intravenous Q8H   metoprolol tartrate 12.5 mg Oral Q12H   pantoprazole 40 mg Oral Q AM   pharmacy consult - MTM  Does not apply Daily   sennosides-docusate sodium 2 tablet Oral BID          Assessment/Plan     Patient Active Problem List   Diagnosis Code   • Multivessel CAD with unstable angina. LV function preserved I25.10   • S/P CABG x 4; 10/30/17 Z95.1   • GERD (gastroesophageal reflux disease) K21.9   • Psoriasis L40.9      CV stable  Home  F/u 4 weeks      BLADE Reddy  11/02/17  11:14 AM

## 2017-11-02 NOTE — PLAN OF CARE
Problem: Patient Care Overview (Adult)  Goal: Plan of Care Review  Outcome: Ongoing (interventions implemented as appropriate)    11/02/17 0531   Coping/Psychosocial Response Interventions   Plan Of Care Reviewed With patient   Outcome Evaluation   Outcome Summary/Follow up Plan Pt rested throughout the night. Prn lortab given for breakthrough pain. IS used while awake. VSS.    Patient Care Overview   Progress improving

## 2017-11-02 NOTE — CONSULTS
"    [facility] Medicine Services  HISTORY AND PHYSICAL    Patient Name: Clarence Hylton  : 1970  MRN: [id number]  Primary Care Physician: No Known Provider    Subjective   Subjective   Chief Complaint:  SOA and failed stress test \"horribly\"    HPI:  Clarence Hylton is a 47 y.o. male who presented to [facility] for evaluation by Dr. Waldron after failing a stress test horribly that was ordered by his work physician. Patient had negative cardiac catheter and it was determined that he had multivessel CAD with unstable angina and needed bypass.  Patient had bypass on 10/30/17, having normal bowel movements, walking the rueda with limited assistance > 200 feet and was told by [facility] that he could be discharged today if all services agreed.  Patient is not a diabetic and having normal bowel movements.  Patient states he's ready to be discharged.    Review of Systems   Gen- No fevers, chills  CV- No chest pain, palpitations  Resp- No cough, dyspnea  GI- No N/V/D, abd pain  Otherwise 10-system ROS reviewed and is negative except as mentioned in the HPI  Personal History     Past Medical History:   Diagnosis Date   • Coronary artery disease    • Essential hypertension 10/30/2017   • GERD (gastroesophageal reflux disease)    • Psoriasis     FACE AND CHEST     Past Surgical History:   Procedure Laterality Date   • BACK SURGERY      x2 lumbar   • CARDIAC CATHETERIZATION N/A 10/27/2017    Procedure: Left Heart Cath;  Surgeon: Clarence Waldron MD;  Location: [facility] CATH INVASIVE LOCATION;  Service:    • CORONARY ARTERY BYPASS GRAFT N/A 10/30/2017    Procedure: CORONARY ARTERY BYPASS X 5 GRAFTING with ENDOSCOPIC VEIN HARVEST;  Surgeon: Reginaldo Starr MD;  Location: [facility] OR;  Service:    • WISDOM TOOTH EXTRACTION       Family History: family history includes No Known Problems in his father and mother.     Social History:  reports that he has never smoked. He has never used smokeless tobacco. He " "reports that he does not drink alcohol or use illicit drugs.    Medications:  Prescriptions Prior to Admission   Medication Sig Dispense Refill Last Dose   • esomeprazole (nexIUM) 20 MG capsule Take 20 mg by mouth Every Morning Before Breakfast.   10/29/2017 at Unknown time     Allergies   Allergen Reactions   • Percocet [Oxycodone-Acetaminophen]      Pt states any medication ending in \"cet\"; \"feels like I'm going to die\"     Objective   Objective   Vital Signs:   Temp:  [99 °F (37.2 °C)] 99 °F (37.2 °C)  Heart Rate:  [] 97  Resp:  [16-20] 20  BP: (105-132)/(65-74) 115/68   Physical Exam   General: Alert, well-developed well-nourished male in no acute distress    Head: Normocephalic atraumatic    Eyes: PERRLA, EOMI, nonicteric, conjunctiva normal    ENT: Pink, moist mucous membranes    Neck: Supple, nontender, trachea midline without lymphadenopathy, JVD, nuchal rigidity.    Cardiovascular: RRR  no M/R/G  +2 DP pulses    Respiratory: Nonlabored, symmetrical chest expansion, clear to auscultation bilaterally    Abdomen: Obese, soft, nontender, nondistended,  positive bowel sounds in all 4 quadrants     Extremities: FROM in upper and lower extremities bilaterally. positive for mild pitting edema  in RLE; negative in LLE. Negative calf pain    Skin: Pink/warm/dry.  No rash or lesions noted.  Sternal incision and right LE in recovery incision healing without drainage, erythema or swelling.    Neuro: Alert and oriented to person place time and situation, speech is clear, follows all commands, recent and remote memory intact    Psych: Patient is pleasant and cooperative.  Normal affect.  Negative suicidal ideation or homicidal ideation.    Results Reviewed:  I have personally reviewed current lab, radiology, and data and agree.    Results from last 7 days  Lab Units 11/02/17  0459  10/31/17  0424   WBC 10*3/mm3 8.79  < > 12.86*   HEMOGLOBIN g/dL 11.5*  < > 11.7*   HEMATOCRIT % 35.0*  < > 36.4*   PLATELETS 10*3/mm3 " 194  < > 262   INR   --   --  1.01   < > = values in this interval not displayed.    Results from last 7 days  Lab Units 11/02/17  0459  10/29/17  1357   SODIUM mmol/L 138  < > 139   POTASSIUM mmol/L 4.3  < > 4.8   CHLORIDE mmol/L 101  < > 104   CO2 mmol/L 29.0  < > 30.0   BUN mg/dL 12  < > 10   CREATININE mg/dL 0.80  < > 0.70   GLUCOSE mg/dL 106*  < > 95   CALCIUM mg/dL 9.0  < > 9.1   ALT (SGPT) U/L  --   --  34   AST (SGOT) U/L  --   --  20   < > = values in this interval not displayed.  No results found for: BNP  pH, Arterial   Date Value Ref Range Status   10/30/2017 7.349 (L) 7.350 - 7.450 pH units Final     Imaging Results (last 24 hours)     Procedure Component Value Units Date/Time    XR Chest 1 View [[id number]] Collected:  11/01/17 1120     Updated:  11/01/17 1219    Narrative:       EXAMINATION: XR CHEST 1 VW-11/01/2017:      INDICATION: Postop Heart Surgery; Z74.09-Other reduced mobility;  I25.10-Atherosclerotic heart disease of native coronary artery without  angina pectoris.      COMPARISON: NONE.     FINDINGS:   1. The [place] catheter has apparently been exchanged for a right deep line  which is well positioned in the SVC above the right atrium.  2. Chest tubes are well positioned. There is no pneumothorax.  3. There is volume reduction with atelectatic stranding at the bases.             Impression:       Stable findings in the chest. Interval removal of the [place]  catheter with a right IJ SVC deep line. Stable atelectasis in the mid  and lower lung zones with volume loss.     D:  11/01/2017  E:  11/01/2017     This report was finalized on 11/1/2017 12:17 PM by Dr. Rikki Rios MD.           Results for orders placed in visit on 10/30/17   Intra-Operative Transesophageal Echocardiogram    Narrative Vilma Eisenberg MD     10/30/2017 10:52 AM  Procedure Performed: Emergent/Open-Heart Anesthesia AMINATA      Start Time:  10/30/2017 10:30 AM       End Time:      Preanesthesia Checklist:  Patient  identified, IV assessed, risks and benefits discussed, monitors   and equipment assessed, procedure being performed at surgeon's request and   anesthesia consent obtained.    General Procedure Information  Diagnostic Indications for Echo:  hemodynamic monitoring  Physician Requesting Echo: MONSE HRENANDEZ  Location performed:  OR  Intubated  Bite block not placed  Heart visualized  Probe Insertion:  Easy  Probe Type:  Multiplane  Modalities:  Color flow mapping, 2D only, continuous wave Doppler and   pulse wave Doppler    Echocardiographic and Doppler Measurements    Ventricles    Right Ventricle:  Cavity size normal.  Hypertrophy not present.  Thrombus not present.    Left Ventricle:  Cavity size normal.  Hypertrophy not present.  Thrombus not present.    Global Function normal.  Ejection Fraction 60%.      Ventricular Regional Function:  1- Basal Anteroseptal:  normal  2- Basal Anterior:  normal  3- Basal Anterolateral:  normal  4- Basal Inferolateral:  normal  5- Basal Inferior:  normal  6- Basal Inferoseptal:  normal  7- Mid Anteroseptal:  normal  8- Mid Anterior:  normal  9- Mid Anterolateral:  normal  10- Mid Inferolateral:  normal  11- Mid Inferior:  normal  12- Mid Inferoseptal:  normal  13- Apical Anterior:  normal  14- Apical Lateral:  normal  15- Apical Inferior:  normal  16- Apical Septal:  normal  17- [place]:  normal      Valves    Aortic Valve:  Annulus normal.  Regurgitation absent.  Leaflets normal.  Leaflet motions   normal.      Mitral Valve:  Annulus normal.  Stenosis not present.  Regurgitation mild.  Leaflets   normal.  Leaflet motions normal.      Tricuspid Valve:  Annulus normal.  Stenosis not present.  Regurgitation trace.  Leaflets   normal.  Leaflet motions normal.          Aorta    Ascending Aorta:  Size normal.  Dissection not present.  Plaque thickness less than 3 mm.    Mobile plaque not present.    Aortic Arch:  Size normal.  Dissection not present.  Plaque thickness less than 3 mm.     Mobile plaque not present.    Descending Aorta:  Size normal.  Dissection not present.  Plaque thickness less than 3 mm.    Mobile plaque not present.          Atria    Right Atrium:  Size normal.  Spontaneous echo contrast not present.  Thrombus not   present.  Tumor not present.  Device not present.      Left Atrium:  Size normal.  Spontaneous echo contrast not present.  Thrombus not   present.  Tumor not present.  Device not present.    Left atrial appendage normal.      Septa    Atrial Septum:  Intra-atrial septal morphology normal.      Ventricular Septum:  Intra-ventricular septum morphology normal.          Other Findings  Pericardium:  normal  Pleural Effusion:  none  Pulmonary Arteries:  normal  Pulmonary Venous Flow:  blunted (decreased) systolic flow    Anesthesia Information  Performed Personally  Anesthesiologist:  LEVAR YAP      Echocardiogram Comments:       Findings discussed with Dr. Starr     Assessment/Plan   Assessment / Plan   Mr. Hylton is a 47-year-old male who presented to [facility] for evaluation by Dr. Waldron after failing a stress test horribly.  Patient had negative cardiac catheter and it was determined that he had multivessel CAD with unstable angina and needed bypass    Assessment & Plan:  Principal Problem:    Multivessel CAD with unstable angina. LV function preserved  - Managed by Cardiology    Active Problems:    S/P CABG x 4; 10/30/17  - Managed by CTS      GERD (gastroesophageal reflux disease)  - Protonix      Psoriasis  - Controlled      Not a diabetic  - A1c 5.4  - blood sugars   - SSI order dc'd      Bowels  -Patient having normal bowel movements  -Discussed narcotics use and constipation; advised patient to buy Colace or Senokot over-the-counter if he gets constipated    DVT prophylaxis:  SQ heparin    CODE STATUS:  Full Code     Dispo:  Ok with hospital medicine service if patient is discharged; if patient stays the night, please feel free to reconsult  hospital medicine service; otherwise we will sign off.    Rosalind Ramírez, JIMMIE   11/02/17   11:41 AM

## 2017-11-02 NOTE — PROGRESS NOTES
CTS Progress Note       LOS: 3 days   Patient Care Team:  No Known Provider as PCP - General  Ez Vines MD as Consulting Physician (Cardiology)  Clarence Waldron MD as Consulting Physician (Cardiology)    No chief complaint on file.      Vital Signs:  Temp:  [99 °F (37.2 °C)] 99 °F (37.2 °C)  Heart Rate:  [] 97  Resp:  [16-20] 20  BP: (101-132)/(58-74) 115/68    Physical Exam: Sternum is stable and incision is satisfactory.  Patient is ambulatory on room air       Results:     Results from last 7 days  Lab Units 11/02/17  0459   WBC 10*3/mm3 8.79   HEMOGLOBIN g/dL 11.5*   HEMATOCRIT % 35.0*   PLATELETS 10*3/mm3 194       Results from last 7 days  Lab Units 11/02/17  0459   SODIUM mmol/L 138   POTASSIUM mmol/L 4.3   CHLORIDE mmol/L 101   CO2 mmol/L 29.0   BUN mg/dL 12   CREATININE mg/dL 0.80   GLUCOSE mg/dL 106*   CALCIUM mg/dL 9.0           Imaging Results (last 24 hours)     Procedure Component Value Units Date/Time    XR Chest 1 View [[id number]] Collected:  11/01/17 1120     Updated:  11/01/17 1219    Narrative:       EXAMINATION: XR CHEST 1 VW-11/01/2017:      INDICATION: Postop Heart Surgery; Z74.09-Other reduced mobility;  I25.10-Atherosclerotic heart disease of native coronary artery without  angina pectoris.      COMPARISON: NONE.     FINDINGS:   1. The [place] catheter has apparently been exchanged for a right deep line  which is well positioned in the SVC above the right atrium.  2. Chest tubes are well positioned. There is no pneumothorax.  3. There is volume reduction with atelectatic stranding at the bases.             Impression:       Stable findings in the chest. Interval removal of the [place]  catheter with a right IJ SVC deep line. Stable atelectasis in the mid  and lower lung zones with volume loss.     D:  11/01/2017  E:  11/01/2017     This report was finalized on 11/1/2017 12:17 PM by Dr. Rikki Rios MD.             Assessment    Principal Problem:    Multivessel CAD with unstable  angina. LV function preserved  Active Problems:    S/P CABG x 4; 10/30/17    GERD (gastroesophageal reflux disease)    Psoriasis    Patient is ambulatory on room air.  He is breathing unlabored.  Potentially could be discharged home today on postoperative day 3 if satisfactory with other physicians.  However should they choose to discharge within 24 hours that is also satisfactory with me    Plan   Lasix 40 mg IV ×1 now.  Discharge home with Plavix 75 mg by mouth daily if satisfactory with other physicians    Please note that portions of this note were completed with a voice recognition program. Efforts were made to edit the dictations, but occasionally words are mistranscribed.    Reginaldo Starr MD  11/02/17  7:28 AM

## 2017-11-07 NOTE — DISCHARGE SUMMARY
CTS Discharge Summary    Patient Care Team:  No Known Provider as PCP - General  Ez Vines MD as Consulting Physician (Cardiology)  Clarence Waldron MD as Consulting Physician (Cardiology)  Consults:   Consults     No orders found from 10/1/2017 to 10/31/2017.          Date of Admission: 10/30/2017  8:42 AM  Date of Discharge:  11/2/2017    Discharge Diagnosis  Past Medical History:   Diagnosis Date   • Coronary artery disease    • Essential hypertension 10/30/2017   • GERD (gastroesophageal reflux disease)    • Psoriasis     FACE AND CHEST     CAD, multiple vessel [I25.10]  CAD (coronary artery disease) [I25.10]     Procedures Performed  Procedure(s):  CORONARY ARTERY BYPASS X 5 GRAFTING with ENDOSCOPIC VEIN HARVEST       History of Present Illness  Patient is a 47 y.o. male with a history of GERD who a few weeks ago started having exertional CP with radiation into his neck with mowing his yard.  He underwent stress testing which was positive for ischemia and underwent cardiac catheterization at [facility] on 10/28/17.  Findings revealed:  Coronary dominance, right LM:   Normal; LAD:  90% mid, 80% early distal, large diagonal proximal 80% eccentric; LCX:  90% mid; RCA:  100% after the PDA with collaterals from the right and left system and LV: LVEF  60%.  He has undergone preoperative testing which revealed:  Carotid duplex:  Nonobstructive disease.  PFT:  FEV 90%.  He presents today to preop to undergo CABG with JAY JAY/EV for SVG grafting.      Hospital Course  Patient was taken to the operating room on 10/30/17 for CABG x4.  Patient was transported to cardiac ICU intubated and in stable condition.  Patient was extubated per protocol.  POD 1 Extubated and up in chair.  [place] catheter removed.    POD 2 Patient diuresed.  Plavix started.  Chest tubes and pacing wires removed.  Patient transferred to telemetry.  POD 3 Patient on room air.  Ambulating well.  Diuresed.  Discharged home on Plavix.      Discharge  Medications   Clarence Hylton   Home Medication Instructions KEREN:[id number]    Printed on:11/07/17 0961   Medication Information                      aspirin 325 MG EC tablet  Take 1 tablet by mouth Daily.             atorvastatin (LIPITOR) 40 MG tablet  Take 1 tablet by mouth Every Night.             clopidogrel (PLAVIX) 75 MG tablet  Take 1 tablet by mouth Daily.             esomeprazole (nexIUM) 20 MG capsule  Take 20 mg by mouth Every Morning Before Breakfast.             HYDROcodone-acetaminophen (NORCO) 5-325 MG per tablet  Take 1 tablet by mouth Every 6 (Six) Hours As Needed for Moderate Pain  for up to 9 days.             metoprolol tartrate (LOPRESSOR) 25 MG tablet  Take 1 tablet by mouth Every 12 (Twelve) Hours.             oxyCODONE-acetaminophen (PERCOCET) 5-325 MG per tablet  Take 1 tablet by mouth Every 6 (Six) Hours As Needed for pain.                 Discharge Diet:   Diet Instructions     Diet: Cardiac       Discharge Diet:  Cardiac                 Activity at Discharge:   Activity Instructions     Discharge Activity Restrictions       1) No driving for 4 weeks and no longer taking narcotics.   2) Do not lift / push / pull more than 10 lbs.                 Follow-up Appointments  Future Appointments  Date Time Provider Department Center   11/10/2017 1:00 PM JIMMIE Bush [facility]   11/21/2017 5:00 PM BLADE Murphy [facility] None   12/6/2017 11:00 AM BLADE Salguero [facility] None           Margo Thompson PA-C  11/07/17  9:45 AM

## 2017-11-15 ENCOUNTER — TELEPHONE (OUTPATIENT)
Dept: CARDIAC SURGERY | Facility: CLINIC | Age: 47
End: 2017-11-15

## 2017-11-21 ENCOUNTER — OFFICE VISIT (OUTPATIENT)
Dept: INTERNAL MEDICINE | Facility: CLINIC | Age: 47
End: 2017-11-21

## 2017-11-21 VITALS
SYSTOLIC BLOOD PRESSURE: 128 MMHG | DIASTOLIC BLOOD PRESSURE: 82 MMHG | HEIGHT: 76 IN | OXYGEN SATURATION: 99 % | BODY MASS INDEX: 32.27 KG/M2 | WEIGHT: 265 LBS | TEMPERATURE: 98.3 F | HEART RATE: 111 BPM

## 2017-11-21 DIAGNOSIS — I25.10 CAD, MULTIPLE VESSEL: Primary | ICD-10-CM

## 2017-11-21 DIAGNOSIS — Z95.1 S/P CABG X 4: ICD-10-CM

## 2017-11-21 PROCEDURE — 99204 OFFICE O/P NEW MOD 45 MIN: CPT | Performed by: PHYSICIAN ASSISTANT

## 2017-11-21 RX ORDER — FLUOCINONIDE TOPICAL SOLUTION USP, 0.05% 0.5 MG/ML
SOLUTION TOPICAL
Refills: 3 | COMMUNITY
Start: 2017-11-03

## 2017-11-21 NOTE — PROGRESS NOTES
Subjective   Clarence Hytlon is a 47 y.o. male who presents to [facility].    Chief Complaint:  Began having chest pain on exertion a few months ago.  In October he saw his work physician who ordered a stress test which was performed by Dr. Vines. He was then sent to Dr. Waldron who performed heart cath and then 3 days later had quadruple bypass surgery with Dr. Starr.  He was admitted to [facility] on 10/30/17, CABG was performed and then he was discharged on 11/2/17. Since discharge he has been feeling well overall. He is doing cardiac rehab on his own at home. Denies CP, SOA, edema or palpitations. Has been monitoring his blood pressure at home which is consistently normal. Taking all discharge medications as directed without any side effects.     Scheduled follow up with Dr. Waldron on 12/5 and Dr. Starr on 12/6.       The following portions of the patient's history were reviewed and updated as appropriate: He  has a past medical history of Coronary artery disease; Essential hypertension (10/30/2017); GERD (gastroesophageal reflux disease); and Psoriasis.  He  does not have any pertinent problems on file.  He  has a past surgical history that includes Back surgery; [place] tooth extraction; Cardiac catheterization (N/A, 10/27/2017); and Coronary artery bypass graft (N/A, 10/30/2017).  His family history includes Aneurysm in his father; Heart valve disorder in his father; No Known Problems in his mother.  He  reports that he has never smoked. He has never used smokeless tobacco. He reports that he does not drink alcohol or use illicit drugs.  Current Outpatient Prescriptions   Medication Sig Dispense Refill   • aspirin 325 MG EC tablet Take 1 tablet by mouth Daily. 360 tablet 0   • atorvastatin (LIPITOR) 40 MG tablet Take 1 tablet by mouth Every Night. 30 tablet 11   • clopidogrel (PLAVIX) 75 MG tablet Take 1 tablet by mouth Daily. 30 tablet 3   • esomeprazole (nexIUM) 20 MG capsule Take 20 mg by mouth Every  Morning Before Breakfast.     • fluocinonide (LIDEX) 0.05 % external solution APPLY ONE TIME A DAY TO AFFECTED AREA AS DIRECTED  3   • metoprolol tartrate (LOPRESSOR) 25 MG tablet Take 1 tablet by mouth Every 12 (Twelve) Hours. 60 tablet 11     No current facility-administered medications for this visit.        Review of Systems  Review of Systems   Constitutional: Negative for activity change, appetite change, chills, fatigue, fever and unexpected weight change.        No malaise   HENT: Negative for ear pain, hearing loss, nosebleeds, rhinorrhea, sore throat, trouble swallowing and voice change.    Eyes: Negative for pain, discharge, redness, itching and visual disturbance.        No dry eyes   Respiratory: Negative for cough, chest tightness, shortness of breath and wheezing.         No SOB with exertion  No SOB lying down   Cardiovascular: Negative for chest pain, palpitations and leg swelling.        No leg cramps   Gastrointestinal: Negative for abdominal pain, blood in stool, constipation, diarrhea, nausea and vomiting.        No frequent heartburn  No change in bowel habits   Endocrine: Negative for cold intolerance, heat intolerance, polydipsia, polyphagia and polyuria.        No Muscle weakness  No feeling of weakness  No Hot flashes   Genitourinary: Negative for decreased urine volume, difficulty urinating, discharge, dysuria, frequency, hematuria, penile pain, testicular pain and urgency.        No lump on testicles   Musculoskeletal: Negative for arthralgias, gait problem, joint swelling and myalgias.        No limb pain  No limb swelling   Skin: Negative for rash and wound.        No rash  No lesions  No Itching  No change in mole   Neurological: Negative for dizziness, tremors, seizures, syncope, weakness, numbness and headaches.        No trouble walking  No confusion  No tingling       Hematological: Negative for adenopathy. Does not bruise/bleed easily.   Psychiatric/Behavioral: Negative for  "dysphoric mood, sleep disturbance and suicidal ideas. The patient is not nervous/anxious.         No change in personality         Objective    /82  Pulse 111  Temp 98.3 °F (36.8 °C)  Ht 76\" (193 cm)  Wt 265 lb (120 kg)  SpO2 99%  BMI 32.26 kg/m2  Physical Exam   Constitutional: He is oriented to person, place, and time. He appears well-developed and well-nourished. No distress.   HENT:   Head: Normocephalic and atraumatic.   Mouth/Throat: Oropharynx is clear and moist.   Eyes: EOM are normal. Pupils are equal, round, and reactive to light.   Neck: Normal range of motion. Neck supple. No thyromegaly present.   Cardiovascular: Normal rate, regular rhythm and normal heart sounds.  Exam reveals no gallop and no friction rub.    No murmur heard.  Pulmonary/Chest: Effort normal and breath sounds normal. No respiratory distress. He has no wheezes. He has no rales. He exhibits no tenderness.   Abdominal: Soft. Bowel sounds are normal. He exhibits no distension and no mass. There is no tenderness. There is no guarding.   Musculoskeletal: Normal range of motion. He exhibits no tenderness.   Lymphadenopathy:     He has no cervical adenopathy.   Neurological: He is alert and oriented to person, place, and time. No cranial nerve deficit. He exhibits normal muscle tone.   Skin: Skin is warm and dry. He is not diaphoretic.        Psychiatric: He has a normal mood and affect. His behavior is normal. Judgment and thought content normal.   Nursing note and vitals reviewed.        Assessment/Plan     1. Multivessel CAD with unstable angina. LV function preserved  2. S/P CABG x 4; 10/30/17  Continue Plavix, Aspirin, Lipitor and Metoprolol.     Hospital admission record reviewed at length. He is stable on optimal CAD medication. F/u with cardiologist and CT surgeon as scheduled on 12/5 and 12/6 respectively.     Return in about 3 months (around 2/21/2018) for Annual physical.  Fasting labs at that time.                "

## 2017-12-06 ENCOUNTER — OFFICE VISIT (OUTPATIENT)
Dept: CARDIAC SURGERY | Facility: CLINIC | Age: 47
End: 2017-12-06

## 2017-12-06 VITALS
DIASTOLIC BLOOD PRESSURE: 84 MMHG | HEIGHT: 76 IN | OXYGEN SATURATION: 97 % | SYSTOLIC BLOOD PRESSURE: 130 MMHG | HEART RATE: 74 BPM | BODY MASS INDEX: 32.71 KG/M2 | WEIGHT: 268.6 LBS | TEMPERATURE: 98.3 F

## 2017-12-06 DIAGNOSIS — Z95.1 S/P CABG X 4: Primary | ICD-10-CM

## 2017-12-06 PROCEDURE — 71020 XR CHEST 2 VW: CPT | Performed by: THORACIC SURGERY (CARDIOTHORACIC VASCULAR SURGERY)

## 2017-12-06 PROCEDURE — 99024 POSTOP FOLLOW-UP VISIT: CPT | Performed by: PHYSICIAN ASSISTANT

## 2017-12-06 RX ORDER — DESONIDE 0.5 MG/G
OINTMENT TOPICAL
Refills: 5 | COMMUNITY
Start: 2017-11-24

## 2017-12-06 NOTE — PROGRESS NOTES
12/06/2017  Patient Information  Clarence Hylton                                                                                          [address] [place] [id number]   1970  'PCP/Referring Physician'  Sirena Vizcarra MD  [phone]  No ref. provider found    Chief Complaint   Patient presents with   • Coronary Artery Disease     Hospital follow-up s/p CABG x4 10/30/17.       History of Present Illness:  Patient presents to office today for postoperative follow-up of CABG ×4 on 10/30/17.  Patient denies chest pain or shortness of breath and states that he has been ambulating daily without difficulty.  The patient was seen yesterday by his primary cardiologist, Dr. Waldron, who stated the patient is doing well.  The patient will be doing his own cardiac rehabilitation at home.  He is otherwise doing well and looking forward to returning to work on February 1, 2018.  Of note, the patient's preoperative carotid duplex revealed a bilateral 0-49%.    Patient Active Problem List   Diagnosis   • Multivessel CAD with unstable angina. LV function preserved   • S/P CABG x 4; 10/30/17   • GERD (gastroesophageal reflux disease)   • Psoriasis     Past Medical History:   Diagnosis Date   • Coronary artery disease    • Essential hypertension 10/30/2017   • GERD (gastroesophageal reflux disease)    • Psoriasis     FACE AND CHEST     Past Surgical History:   Procedure Laterality Date   • BACK SURGERY      x2 lumbar   • CARDIAC CATHETERIZATION N/A 10/27/2017    Procedure: Left Heart Cath;  Surgeon: Clarence Waldron MD;  Location: [facility] CATH INVASIVE LOCATION;  Service:    • CORONARY ARTERY BYPASS GRAFT N/A 10/30/2017    Procedure: CORONARY ARTERY BYPASS X 5 GRAFTING with ENDOSCOPIC VEIN HARVEST;  Surgeon: Reginaldo Starr MD;  Location: [facility] OR;  Service:    • WISDOM TOOTH EXTRACTION         Current Outpatient Prescriptions:   •  aspirin 325 MG EC tablet, Take 1 tablet by mouth Daily., Disp: 360  "tablet, Rfl: 0  •  atorvastatin (LIPITOR) 40 MG tablet, Take 1 tablet by mouth Every Night., Disp: 30 tablet, Rfl: 11  •  clopidogrel (PLAVIX) 75 MG tablet, Take 1 tablet by mouth Daily., Disp: 30 tablet, Rfl: 3  •  desonide (DESOWEN) 0.05 % ointment, APPLY SPARINGLY TO AFFECTED AREAS TWO TO FOUR TIMES A DAY AS NEEDED, Disp: , Rfl: 5  •  esomeprazole (nexIUM) 20 MG capsule, Take 20 mg by mouth Every Morning Before Breakfast., Disp: , Rfl:   •  fluocinonide (LIDEX) 0.05 % external solution, APPLY ONE TIME A DAY TO AFFECTED AREA AS DIRECTED, Disp: , Rfl: 3  •  metoprolol tartrate (LOPRESSOR) 25 MG tablet, Take 1 tablet by mouth Every 12 (Twelve) Hours., Disp: 60 tablet, Rfl: 11  Allergies   Allergen Reactions   • Percocet [Oxycodone-Acetaminophen]      Pt states any medication ending in \"cet\"; \"feels like I'm going to die\"     Social History     Social History   • Marital status:      Spouse name: N/A   • Number of children: 2   • Years of education: N/A     Occupational History   • [employer]      Social History Main Topics   • Smoking status: Never Smoker   • Smokeless tobacco: Never Used   • Alcohol use No   • Drug use: No   • Sexual activity: Defer     Other Topics Concern   • Not on file     Social History Narrative    Lives in [place], [place] with spouse     Family History   Problem Relation Age of Onset   • No Known Problems Mother    • Heart valve disorder Father    • Aneurysm Father      AORTA     ROS: As per HPI, otherwise negative.   Vitals:    12/06/17 1119   BP: 130/84   BP Location: Right arm   Patient Position: Sitting   Pulse: 74   Temp: 98.3 °F (36.8 °C)   SpO2: 97%   Weight: 122 kg (268 lb 9.6 oz)   Height: 193 cm (76\")      Physical Exam:  Gen - NAD, pleasant, cooperative  CV - Regular rate and rhythm, no murmur gallop or rub  Pulm - Lungs clear to auscultation without wheeze or rhonchi   GI - Soft, normoactive bowel sounds, non-tender  Ext - Without edema, right EVH site healing well  Incision - " Sternum stable, no evidence of incisional dehiscence or cellulitis    Labs/Imagin17 CXR  Good quality chest radiograph. Bony structures are within normal limits. Trachea is midline. Left and right lung fields clear, costophrenic angles are sharp. Sternal wires are manifestation of CABG procedure. No evidence of pleural effusion or pneumothorax. Compared to most recent chest xray (17), there is no acute chest pathology.     Assessment/Plan:  Patient is a 47-year-old  male with history of coronary artery disease status post CABG ×4 on 10/30/17.  Patient is having a steady postoperative course.  He was seen by Dr. Waldron yesterday and will follow up with him in 6 months.  He is choosing to perform cardiac rehabilitation at home.  His incision is healing well, his sternum is stable and his chest x-ray is within normal limits.  I discussed the findings of his preoperative duplex with him, which he appeared to understand.  He is slated to return to work on 2018.  He is otherwise doing well and may resume normal physical activity in 6 weeks.  The patient may follow-up as needed, but should call with any questions or concerns, should any arise.       Patient Active Problem List   Diagnosis   • Multivessel CAD with unstable angina. LV function preserved   • S/P CABG x 4; 10/30/17   • GERD (gastroesophageal reflux disease)   • Psoriasis     Signed by:

## 2018-03-13 RX ORDER — CLOPIDOGREL BISULFATE 75 MG/1
TABLET ORAL
Qty: 30 TABLET | Refills: 1 | OUTPATIENT
Start: 2018-03-13

## 2020-11-21 NOTE — TELEPHONE ENCOUNTER
I called Mr. Hylton let him know that his Disability forms have been faxed to his employer. Sadaf Reddy in his HR office is going to fax them to [employer].          No

## 2022-05-02 ENCOUNTER — TELEPHONE (OUTPATIENT)
Dept: SURGERY | Facility: CLINIC | Age: 52
End: 2022-05-02

## 2022-06-09 NOTE — TELEPHONE ENCOUNTER
I called pt again, left another message on voice mail asking him to return my call.  I will mail a letter to him to remind him to call.

## (undated) DEVICE — SUT PDS 1 CTX 36IN VIO PDP371T

## (undated) DEVICE — TUBING, SUCTION, 1/4" X 10', STRAIGHT: Brand: MEDLINE

## (undated) DEVICE — MEDI-VAC YANKAUER SUCTION HANDLE: Brand: CARDINAL HEALTH

## (undated) DEVICE — VASOVIEW HEMOPRO: Brand: VASOVIEW HEMOPRO

## (undated) DEVICE — MEDI-VAC NON-CONDUCTIVE SUCTION TUBING: Brand: CARDINAL HEALTH

## (undated) DEVICE — CANNULA,ADULT,SOFT-TOUCH,7'TUBE,UC: Brand: PENDING

## (undated) DEVICE — SAFETY SCALPEL: Brand: DEROYAL

## (undated) DEVICE — PK CATH CARD 10

## (undated) DEVICE — TRAP,MUCUS SPECIMEN,40CC: Brand: MEDLINE

## (undated) DEVICE — Device

## (undated) DEVICE — ANTIBACTERIAL UNDYED BRAIDED (POLYGLACTIN 910), SYNTHETIC ABSORBABLE SUTURE: Brand: COATED VICRYL

## (undated) DEVICE — SUT PROLN 4/0 RB1 D/A 36IN 8557H

## (undated) DEVICE — CLTH CLENS READYCLEANSE PERI CARE PK/5

## (undated) DEVICE — DUAL LUMEN STOMACH TUBE,ANTI-REFLUX VALVE: Brand: SALEM SUMP

## (undated) DEVICE — DEV COMP RAD PRELUDESYNC 24CM

## (undated) DEVICE — RESUSCITATOR,MANUAL,ADLT,MASK,TUBE RES: Brand: MEDLINE INDUSTRIES, INC.

## (undated) DEVICE — AIRWY SZ11

## (undated) DEVICE — MEDI-VAC YANKAUER SUCTION HANDLE W/BULBOUS TIP: Brand: CARDINAL HEALTH

## (undated) DEVICE — SUT PROLN 7/0 CV BV1 24IN 8304H BX/36

## (undated) DEVICE — OASIS DRAIN, SINGLE, INLINE & ATS COMPATIBLE: Brand: OASIS

## (undated) DEVICE — SUT PROLN 4/0 SH D/A 36IN 8521H

## (undated) DEVICE — CATH DIAG EXPO .045 FL3  5F 100CM

## (undated) DEVICE — 12 FOOT DISPOSABLE EXTENSION CABLE WITH SAFE CONNECT / SCREW-DOWN

## (undated) DEVICE — 32 FR STRAIGHT – SOFT PVC CATHETER: Brand: PVC THORACIC CATHETERS

## (undated) DEVICE — SUT PROLN 7/0 BV1 D/A 30IN 8703H

## (undated) DEVICE — SUT SILK 0/0 CT2 18IN C027D

## (undated) DEVICE — DRSNG SURESITE WNDW 4X4.5

## (undated) DEVICE — CONNECTOR PH 6-IN-1 Y ST: Brand: CARDINAL HEALTH

## (undated) DEVICE — SPNG GZ WOVN 4X4IN 12PLY 10/BX STRL

## (undated) DEVICE — PRESSURE MONITORING SET: Brand: TRUWAVE, VAMP

## (undated) DEVICE — SUT PROLN 6/0 C1 D/A 30IN 8706H

## (undated) DEVICE — TEMP PACING WIRE: Brand: MYO/WIRE

## (undated) DEVICE — PK HEART OPN 10

## (undated) DEVICE — NDL PERC 1PRT THNWALL W/BASEPLT 18G 7CM

## (undated) DEVICE — GLIDESHEATH SLENDER STAINLESS STEEL KIT: Brand: GLIDESHEATH SLENDER

## (undated) DEVICE — ST BLOOD ADMIN YTP 80IN

## (undated) DEVICE — 3M™ TEGADERM™ CHG DRESSING 25/CARTON 4 CARTONS/CASE 1658: Brand: TEGADERM™

## (undated) DEVICE — ST INF 10DRP 4 PORT 4WY/STPCOCK 112IN

## (undated) DEVICE — PRESSURE TUBING: Brand: TRUWAVE

## (undated) DEVICE — GW INQWIRE FC PTFE STD J/1.5 .035 260

## (undated) DEVICE — AVANTI + 4F STD W/GW: Brand: AVANTI

## (undated) DEVICE — SUT PROLN 3/0 SH D/A 36IN 8522H

## (undated) DEVICE — INTRAOPERATIVE COVER KIT, 10 PACK: Brand: SITE-RITE

## (undated) DEVICE — SOL NS 500ML

## (undated) DEVICE — SUT SILK 4/0 TIES 18IN A183H

## (undated) DEVICE — SUCTION CANISTER, 2500CC, RIGID: Brand: DEROYAL

## (undated) DEVICE — SUT SILK 2 SUTUPAK TIE 60IN SA8H 2STRAND

## (undated) DEVICE — PRESSURE MONITORING SET: Brand: TRUWAVE

## (undated) DEVICE — ST PRIM GRVTY NDLESS 3 INJ PORT 105IN

## (undated) DEVICE — COVADERM PLUS: Brand: DEROYAL

## (undated) DEVICE — 32 FR RIGHT ANGLE – SOFT PVC CATHETER: Brand: PVC THORACIC CATHETERS

## (undated) DEVICE — Device: Brand: MEDEX

## (undated) DEVICE — 3M™ RANGER™ BLOOD/FLUID WARMING STANDARD FLOW SET, 24200, 10/CASE: Brand: 3M™ RANGER™

## (undated) DEVICE — TOWEL,OR,DSP,ST,BLUE,STD,8/PK,10PK/CS: Brand: MEDLINE

## (undated) DEVICE — PRESSURE MONITORING ACCESSORY: Brand: TRUWAVE

## (undated) DEVICE — MODEL AT P54, P/N 700608-035KIT CONTENTS: HAND CONTROLLER, 3-WAY HIGH-PRESSURE STOPCOCK WITH ROTATING END AND PREMIUM HIGH-PRESSURE TUBING: Brand: ANGIOTOUCH® KIT

## (undated) DEVICE — 2963 MEDIPORE SOFT CLOTH TAPE 3 IN X 10 YD 12 RLS/CS: Brand: 3M™ MEDIPORE™